# Patient Record
Sex: MALE | Race: BLACK OR AFRICAN AMERICAN | Employment: FULL TIME | URBAN - NONMETROPOLITAN AREA
[De-identification: names, ages, dates, MRNs, and addresses within clinical notes are randomized per-mention and may not be internally consistent; named-entity substitution may affect disease eponyms.]

---

## 2020-07-20 ENCOUNTER — HOSPITAL ENCOUNTER (EMERGENCY)
Age: 40
Discharge: HOME | End: 2020-07-21
Attending: EMERGENCY MEDICINE
Payer: MEDICAID

## 2020-07-20 ENCOUNTER — APPOINTMENT (OUTPATIENT)
Dept: GENERAL RADIOLOGY | Age: 40
End: 2020-07-20
Payer: MEDICAID

## 2020-07-20 PROCEDURE — 99284 EMERGENCY DEPT VISIT MOD MDM: CPT

## 2020-07-20 PROCEDURE — 6360000002 HC RX W HCPCS: Performed by: EMERGENCY MEDICINE

## 2020-07-20 PROCEDURE — 73502 X-RAY EXAM HIP UNI 2-3 VIEWS: CPT

## 2020-07-20 PROCEDURE — 96372 THER/PROPH/DIAG INJ SC/IM: CPT

## 2020-07-20 PROCEDURE — 72100 X-RAY EXAM L-S SPINE 2/3 VWS: CPT

## 2020-07-20 PROCEDURE — 71046 X-RAY EXAM CHEST 2 VIEWS: CPT

## 2020-07-20 PROCEDURE — 73030 X-RAY EXAM OF SHOULDER: CPT

## 2020-07-20 PROCEDURE — 72040 X-RAY EXAM NECK SPINE 2-3 VW: CPT

## 2020-07-20 PROCEDURE — 73564 X-RAY EXAM KNEE 4 OR MORE: CPT

## 2020-07-20 RX ORDER — ORPHENADRINE CITRATE 30 MG/ML
60 INJECTION INTRAMUSCULAR; INTRAVENOUS ONCE
Status: COMPLETED | OUTPATIENT
Start: 2020-07-20 | End: 2020-07-20

## 2020-07-20 RX ORDER — METHYLPREDNISOLONE SODIUM SUCCINATE 125 MG/2ML
40 INJECTION, POWDER, LYOPHILIZED, FOR SOLUTION INTRAMUSCULAR; INTRAVENOUS ONCE
Status: COMPLETED | OUTPATIENT
Start: 2020-07-20 | End: 2020-07-20

## 2020-07-20 RX ORDER — KETOROLAC TROMETHAMINE 30 MG/ML
30 INJECTION, SOLUTION INTRAMUSCULAR; INTRAVENOUS ONCE
Status: COMPLETED | OUTPATIENT
Start: 2020-07-20 | End: 2020-07-20

## 2020-07-20 RX ADMIN — ORPHENADRINE CITRATE 60 MG: 30 INJECTION INTRAMUSCULAR; INTRAVENOUS at 22:10

## 2020-07-20 RX ADMIN — KETOROLAC TROMETHAMINE 30 MG: 30 INJECTION, SOLUTION INTRAMUSCULAR at 22:09

## 2020-07-20 RX ADMIN — METHYLPREDNISOLONE SODIUM SUCCINATE 40 MG: 125 INJECTION, POWDER, FOR SOLUTION INTRAMUSCULAR; INTRAVENOUS at 22:09

## 2020-07-20 ASSESSMENT — ENCOUNTER SYMPTOMS
BLOOD IN STOOL: 0
DIARRHEA: 0
SORE THROAT: 0
CHOKING: 0
ABDOMINAL PAIN: 0
BACK PAIN: 1
RHINORRHEA: 0
PHOTOPHOBIA: 0
SHORTNESS OF BREATH: 0
EYE ITCHING: 0
CHEST TIGHTNESS: 0
SINUS PRESSURE: 0
WHEEZING: 0
COUGH: 0
NAUSEA: 0
VOICE CHANGE: 0
EYE DISCHARGE: 0
TROUBLE SWALLOWING: 0
EYE PAIN: 0
VOMITING: 0
EYE REDNESS: 0
ABDOMINAL DISTENTION: 0
CONSTIPATION: 0

## 2020-07-20 ASSESSMENT — PAIN SCALES - GENERAL
PAINLEVEL_OUTOF10: 4
PAINLEVEL_OUTOF10: 7
PAINLEVEL_OUTOF10: 8

## 2020-07-20 ASSESSMENT — PAIN DESCRIPTION - PAIN TYPE
TYPE: ACUTE PAIN
TYPE: ACUTE PAIN

## 2020-07-20 ASSESSMENT — PAIN DESCRIPTION - LOCATION: LOCATION: SHOULDER;KNEE;BACK

## 2020-07-20 ASSESSMENT — PAIN DESCRIPTION - ORIENTATION: ORIENTATION: LEFT

## 2020-07-20 NOTE — LETTER
[address] [facility]"" [address] [address] EMERGENCY DEPT  [address] [place] [address]  Phone: [phone]             July 21, 2020    Patient: Abdiel Thomas   YOB: 1980   Date of Visit: 7/20/2020       To Whom It May Concern:    Abdiel Thomas was seen and treated in our emergency department on 7/20/2020. He may return to work on 7/22/2020.       Sincerely,   Sarai Morales Call RN          Signature:__________________________________

## 2020-07-21 ENCOUNTER — APPOINTMENT (OUTPATIENT)
Dept: CT IMAGING | Age: 40
End: 2020-07-21
Payer: MEDICAID

## 2020-07-21 VITALS
HEIGHT: 68 IN | HEART RATE: 76 BPM | WEIGHT: 195 LBS | OXYGEN SATURATION: 96 % | RESPIRATION RATE: 16 BRPM | TEMPERATURE: 98.3 F | DIASTOLIC BLOOD PRESSURE: 79 MMHG | SYSTOLIC BLOOD PRESSURE: 123 MMHG | BODY MASS INDEX: 29.55 KG/M2

## 2020-07-21 PROCEDURE — 72192 CT PELVIS W/O DYE: CPT

## 2020-07-21 PROCEDURE — 6370000000 HC RX 637 (ALT 250 FOR IP): Performed by: EMERGENCY MEDICINE

## 2020-07-21 RX ORDER — HYDROCODONE BITARTRATE AND ACETAMINOPHEN 5; 325 MG/1; MG/1
1 TABLET ORAL ONCE
Status: COMPLETED | OUTPATIENT
Start: 2020-07-21 | End: 2020-07-21

## 2020-07-21 RX ORDER — IBUPROFEN 400 MG/1
400 TABLET ORAL EVERY 6 HOURS PRN
Qty: 120 TABLET | Refills: 0 | Status: SHIPPED | OUTPATIENT
Start: 2020-07-21 | End: 2021-04-01

## 2020-07-21 RX ORDER — CYCLOBENZAPRINE HCL 10 MG
10 TABLET ORAL 3 TIMES DAILY PRN
Qty: 21 TABLET | Refills: 0 | Status: SHIPPED | OUTPATIENT
Start: 2020-07-21 | End: 2020-07-31

## 2020-07-21 RX ADMIN — HYDROCODONE BITARTRATE AND ACETAMINOPHEN 1 TABLET: 5; 325 TABLET ORAL at 00:59

## 2020-07-21 ASSESSMENT — PAIN SCALES - GENERAL
PAINLEVEL_OUTOF10: 6
PAINLEVEL_OUTOF10: 5
PAINLEVEL_OUTOF10: 6

## 2020-07-21 ASSESSMENT — PAIN DESCRIPTION - PAIN TYPE: TYPE: ACUTE PAIN
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
## 2020-07-21 NOTE — ED NOTES
Pt medicated per MAR. Pt tolerated well. Pt shows no signs of distress.       Zeny GREY RN  07/21/20 6432

## 2020-07-21 NOTE — ED NOTES
Pt medicated per MAR. Pt tolerated well. Pt updated on POC. Pt respirations unlabored.       Vandana CASTILLO, RN  07/20/20 2521

## 2021-01-06 ENCOUNTER — HOSPITAL ENCOUNTER (EMERGENCY)
Age: 41
Discharge: HOME | End: 2021-01-06
Payer: MEDICAID

## 2021-01-06 ENCOUNTER — APPOINTMENT (OUTPATIENT)
Dept: GENERAL RADIOLOGY | Age: 41
End: 2021-01-06
Payer: MEDICAID

## 2021-01-06 VITALS
RESPIRATION RATE: 18 BRPM | HEIGHT: 68 IN | WEIGHT: 200 LBS | BODY MASS INDEX: 30.31 KG/M2 | TEMPERATURE: 97.5 F | DIASTOLIC BLOOD PRESSURE: 95 MMHG | OXYGEN SATURATION: 97 % | SYSTOLIC BLOOD PRESSURE: 124 MMHG | HEART RATE: 61 BPM

## 2021-01-06 DIAGNOSIS — R07.89 CHEST WALL PAIN: Primary | ICD-10-CM

## 2021-01-06 LAB
ANION GAP SERPL CALCULATED.3IONS-SCNC: 12 MEQ/L (ref 8–16)
BASOPHILS # BLD: 0.5 %
BASOPHILS ABSOLUTE: 0 THOU/MM3 (ref 0–0.1)
BUN BLDV-MCNC: 10 MG/DL (ref 7–22)
CALCIUM SERPL-MCNC: 8.9 MG/DL (ref 8.5–10.5)
CHLORIDE BLD-SCNC: 105 MEQ/L (ref 98–111)
CO2: 22 MEQ/L (ref 23–33)
CREAT SERPL-MCNC: 0.7 MG/DL (ref 0.4–1.2)
EKG ATRIAL RATE: 63 BPM
EKG P AXIS: 40 DEGREES
EKG P-R INTERVAL: 188 MS
EKG Q-T INTERVAL: 386 MS
EKG QRS DURATION: 84 MS
EKG QTC CALCULATION (BAZETT): 395 MS
EKG R AXIS: 59 DEGREES
EKG T AXIS: 45 DEGREES
EKG VENTRICULAR RATE: 63 BPM
EOSINOPHIL # BLD: 1.9 %
EOSINOPHILS ABSOLUTE: 0.1 THOU/MM3 (ref 0–0.4)
ERYTHROCYTE [DISTWIDTH] IN BLOOD BY AUTOMATED COUNT: 11.3 % (ref 11.5–14.5)
ERYTHROCYTE [DISTWIDTH] IN BLOOD BY AUTOMATED COUNT: 39.6 FL (ref 35–45)
GFR SERPL CREATININE-BSD FRML MDRD: > 90 ML/MIN/1.73M2
GLUCOSE BLD-MCNC: 82 MG/DL (ref 70–108)
HCT VFR BLD CALC: 42.5 % (ref 42–52)
HEMOGLOBIN: 14.5 GM/DL (ref 14–18)
IMMATURE GRANS (ABS): 0.04 THOU/MM3 (ref 0–0.07)
IMMATURE GRANULOCYTES: 0.6 %
LYMPHOCYTES # BLD: 40.4 %
LYMPHOCYTES ABSOLUTE: 2.6 THOU/MM3 (ref 1–4.8)
MCH RBC QN AUTO: 32.7 PG (ref 26–33)
MCHC RBC AUTO-ENTMCNC: 34.1 GM/DL (ref 32.2–35.5)
MCV RBC AUTO: 95.7 FL (ref 80–94)
MONOCYTES # BLD: 10.6 %
MONOCYTES ABSOLUTE: 0.7 THOU/MM3 (ref 0.4–1.3)
NUCLEATED RED BLOOD CELLS: 0 /100 WBC
OSMOLALITY CALCULATION: 275.7 MOSMOL/KG (ref 275–300)
PLATELET # BLD: 220 THOU/MM3 (ref 130–400)
PMV BLD AUTO: 9.6 FL (ref 9.4–12.4)
POTASSIUM SERPL-SCNC: 4.3 MEQ/L (ref 3.5–5.2)
RBC # BLD: 4.44 MILL/MM3 (ref 4.7–6.1)
SEG NEUTROPHILS: 46 %
SEGMENTED NEUTROPHILS ABSOLUTE COUNT: 2.9 THOU/MM3 (ref 1.8–7.7)
SODIUM BLD-SCNC: 139 MEQ/L (ref 135–145)
TROPONIN T: < 0.01 NG/ML
TROPONIN T: < 0.01 NG/ML
WBC # BLD: 6.4 THOU/MM3 (ref 4.8–10.8)

## 2021-01-06 PROCEDURE — 36415 COLL VENOUS BLD VENIPUNCTURE: CPT

## 2021-01-06 PROCEDURE — 71046 X-RAY EXAM CHEST 2 VIEWS: CPT

## 2021-01-06 PROCEDURE — 85025 COMPLETE CBC W/AUTO DIFF WBC: CPT

## 2021-01-06 PROCEDURE — 84484 ASSAY OF TROPONIN QUANT: CPT

## 2021-01-06 PROCEDURE — 99284 EMERGENCY DEPT VISIT MOD MDM: CPT

## 2021-01-06 PROCEDURE — 6370000000 HC RX 637 (ALT 250 FOR IP): Performed by: PHYSICIAN ASSISTANT

## 2021-01-06 PROCEDURE — 96374 THER/PROPH/DIAG INJ IV PUSH: CPT

## 2021-01-06 PROCEDURE — 80048 BASIC METABOLIC PNL TOTAL CA: CPT

## 2021-01-06 PROCEDURE — 6360000002 HC RX W HCPCS: Performed by: PHYSICIAN ASSISTANT

## 2021-01-06 PROCEDURE — 93005 ELECTROCARDIOGRAM TRACING: CPT | Performed by: PHYSICIAN ASSISTANT

## 2021-01-06 PROCEDURE — 96372 THER/PROPH/DIAG INJ SC/IM: CPT

## 2021-01-06 RX ORDER — NAPROXEN 500 MG/1
500 TABLET ORAL 2 TIMES DAILY
Qty: 60 TABLET | Refills: 0 | Status: SHIPPED | OUTPATIENT
Start: 2021-01-06 | End: 2021-01-27

## 2021-01-06 RX ORDER — LIDOCAINE 50 MG/G
1 PATCH TOPICAL DAILY
Qty: 15 PATCH | Refills: 0 | Status: SHIPPED | OUTPATIENT
Start: 2021-01-06 | End: 2021-01-27

## 2021-01-06 RX ORDER — LIDOCAINE 4 G/G
1 PATCH TOPICAL ONCE
Status: DISCONTINUED | OUTPATIENT
Start: 2021-01-06 | End: 2021-01-06 | Stop reason: HOSPADM

## 2021-01-06 RX ORDER — CYCLOBENZAPRINE HCL 10 MG
5 TABLET ORAL 3 TIMES DAILY PRN
Qty: 15 TABLET | Refills: 0 | Status: SHIPPED | OUTPATIENT
Start: 2021-01-06 | End: 2021-01-16

## 2021-01-06 RX ORDER — KETOROLAC TROMETHAMINE 30 MG/ML
30 INJECTION, SOLUTION INTRAMUSCULAR; INTRAVENOUS ONCE
Status: COMPLETED | OUTPATIENT
Start: 2021-01-06 | End: 2021-01-06

## 2021-01-06 RX ORDER — ORPHENADRINE CITRATE 30 MG/ML
60 INJECTION INTRAMUSCULAR; INTRAVENOUS ONCE
Status: COMPLETED | OUTPATIENT
Start: 2021-01-06 | End: 2021-01-06

## 2021-01-06 RX ADMIN — ORPHENADRINE CITRATE 60 MG: 30 INJECTION INTRAMUSCULAR; INTRAVENOUS at 10:52

## 2021-01-06 RX ADMIN — KETOROLAC TROMETHAMINE 30 MG: 30 INJECTION, SOLUTION INTRAMUSCULAR at 10:52

## 2021-01-06 ASSESSMENT — ENCOUNTER SYMPTOMS
DIARRHEA: 0
ABDOMINAL PAIN: 0
SHORTNESS OF BREATH: 0
COUGH: 0
SORE THROAT: 0
COLOR CHANGE: 0
VOMITING: 0

## 2021-01-06 ASSESSMENT — PAIN SCALES - GENERAL: PAINLEVEL_OUTOF10: 7

## 2021-01-06 ASSESSMENT — PAIN DESCRIPTION - DESCRIPTORS: DESCRIPTORS: PRESSURE

## 2021-01-06 ASSESSMENT — PAIN DESCRIPTION - LOCATION: LOCATION: ARM;CHEST

## 2021-01-06 ASSESSMENT — PAIN DESCRIPTION - ORIENTATION: ORIENTATION: RIGHT

## 2021-01-06 NOTE — ED NOTES
Pt resting in bed with side rails up 2x2 and call light in reach. Vital signs assessed and stable. IV site checked. Pt updated on plan of care. Pt voiced understanding. Pt verbalized no other needs or concerns at this time.         Jeaentte Caruso RN  01/06/21 9508

## 2021-01-06 NOTE — ED TRIAGE NOTES
PT in bed. PT stated that two days ago he started having chest pressure. PT stated that he was laying down at the time. PT stated that the pressure is increasing. PT stated that he feels in on his right chest and down his right arm. PT in no distress.

## 2021-01-07 NOTE — ED PROVIDER NOTES
[facility]  eMERGENCY dEPARTMENT eNCOUnter          CHIEF COMPLAINT       Chief Complaint   Patient presents with    Chest Pain       Nurses Notes reviewed and I agree except as noted inthe HPI. HISTORY OF PRESENT ILLNESS    Billy Forrester is a 41 y.o. male who presents to the Emergency Department for the evaluation of chest pain. Patient states that 2 days ago while laying down he began having pain in the right chest.  He describes this as a heaviness and a pressure, feeling almost like he has a dislocated shoulder. Reports worsening with movement of the shoulder or elevation of the arm and states pain is been constant, mildly improved with Tylenol. He states that it initially radiated into the back and then into the chest.  He reports some intermittent numbness in his right hand and occasionally numbness throughout the whole right upper extremity with movement of the arm. He denies any known injury or activity changes. No radicular pain. No shortness of breath, dizziness, nausea, syncope, edema, fevers, chills or recent travel. No history of hypertension, hyperlipidemia, diabetes or smoking and family history of heart disease did not start until his mother was in her 76s. The HPI was provided by the patient. REVIEW OF SYSTEMS     Review of Systems   Constitutional: Negative for chills and fever. HENT: Negative for sore throat. Respiratory: Negative for cough and shortness of breath. Cardiovascular: Positive for chest pain. Negative for palpitations and leg swelling. Gastrointestinal: Negative for abdominal pain, diarrhea and vomiting. Musculoskeletal: Positive for arthralgias and neck pain (mild). Skin: Negative for color change. Neurological: Positive for numbness. Negative for syncope and headaches. PAST MEDICAL HISTORY    has no past medical history on file. SURGICAL HISTORY      has no past surgical history on file.     CURRENT MEDICATIONS Discharge Medication List as of 2021  1:08 PM      CONTINUE these medications which have NOT CHANGED    Details   ibuprofen (IBU) 400 MG tablet Take 1 tablet by mouth every 6 hours as needed for Pain, Disp-120 tablet,R-0Print             ALLERGIES     has No Known Allergies. FAMILY HISTORY     He indicated that his mother is alive. He indicated that his father is . family history includes Heart Disease in his mother. SOCIAL HISTORY      reports that he has quit smoking. He has never used smokeless tobacco. He reports current alcohol use. He reports that he does not use drugs. PHYSICAL EXAM     INITIAL VITALS:  height is 5' 8\" (1.727 m) and weight is 200 lb (90.7 kg). His oral temperature is 97.5 °F (36.4 °C). His blood pressure is 124/95 (abnormal) and his pulse is 61. His respiration is 18 and oxygen saturation is 97%. Physical Exam  Vitals signs and nursing note reviewed. Constitutional:       Appearance: Normal appearance. HENT:      Head: Normocephalic and atraumatic. Eyes:      Conjunctiva/sclera: Conjunctivae normal.   Neck:      Musculoskeletal: Normal range of motion. No muscular tenderness. Cardiovascular:      Rate and Rhythm: Normal rate and regular rhythm. Pulses: Normal pulses. Radial pulses are 2+ on the right side. Heart sounds: Normal heart sounds. No murmur. Pulmonary:      Effort: Pulmonary effort is normal. No respiratory distress. Breath sounds: Normal breath sounds. No wheezing. Musculoskeletal:      Right shoulder: Normal.      Cervical back: Normal.      Thoracic back: Normal.        Arms:    Skin:     General: Skin is warm and dry. Neurological:      General: No focal deficit present. Mental Status: He is alert and oriented to person, place, and time. GCS: GCS eye subscore is 4. GCS verbal subscore is 5. GCS motor subscore is 6. Cranial Nerves: Cranial nerves are intact. Sensory: Sensation is intact. Motor: Motor function is intact. Psychiatric:         Mood and Affect: Mood normal.         DIFFERENTIAL DIAGNOSIS:   Differential diagnoses are discussed    DIAGNOSTIC RESULTS     EKG: All EKG's are interpreted by the Emergency Department Physician who either signs or Co-signsthis chart in the absence of a cardiologist.    Vent. Rate: 63 bpm  PRinterval: 188 ms  QRS duration: 84 ms  QTc: 395 ms  P-R-T axes: 40, 59, 45  Normal sinus rhythm. No STEMI. RADIOLOGY: non-plain film images(s) such as CT, Ultrasound and MRI are read by the radiologist.    XR CHEST (2 VW)   Final Result   Stable radiographic appearance of the chest. No evidence of an acute process. **This report has been created using voice recognition software. It may contain minor errors which are inherent in voice recognition technology. **      Final report electronically signed by Dr. Kate Sanchez on 1/6/2021 9:49 AM          LABS:      Labs Reviewed   CBC WITH AUTO DIFFERENTIAL - Abnormal; Notable for the following components:       Result Value    RBC 4.44 (*)     MCV 95.7 (*)     RDW-CV 11.3 (*)     All other components within normal limits   BASIC METABOLIC PANEL - Abnormal; Notable for the following components:    CO2 22 (*)     All other components within normal limits   TROPONIN   ANION GAP   OSMOLALITY   GLOMERULAR FILTRATION RATE, ESTIMATED   TROPONIN       EMERGENCY DEPARTMENT COURSE:   Vitals:    Vitals:    01/06/21 0911 01/06/21 1050   BP: (!) 124/95    Pulse: 59 61   Resp: 18    Temp: 97.5 °F (36.4 °C)    TempSrc: Oral    SpO2: 98% 97%   Weight: 200 lb (90.7 kg)    Height: 5' 8\" (1.727 m)       10:13 PM EST: The patient was seen and evaluated. Patient presents for complaints of right-sided chest pain has been ongoing for the past 2 days. It is constant, worsens with movement of the right arm with occasional right upper extremity paresthesias. He had intact sensation, movement, vascular flow on examination.   No

## 2021-01-17 ENCOUNTER — SURGICAL CONSULT (OUTPATIENT)
Dept: UROLOGY | Age: 41
End: 2021-01-17

## 2021-01-17 ENCOUNTER — HOSPITAL ENCOUNTER (EMERGENCY)
Age: 41
Discharge: HOME | End: 2021-01-17
Attending: EMERGENCY MEDICINE
Payer: MEDICAID

## 2021-01-17 VITALS
OXYGEN SATURATION: 96 % | RESPIRATION RATE: 15 BRPM | BODY MASS INDEX: 30.41 KG/M2 | SYSTOLIC BLOOD PRESSURE: 131 MMHG | HEART RATE: 76 BPM | WEIGHT: 200 LBS | DIASTOLIC BLOOD PRESSURE: 77 MMHG | TEMPERATURE: 97.6 F

## 2021-01-17 DIAGNOSIS — N48.30 PRIAPISM: Primary | ICD-10-CM

## 2021-01-17 PROCEDURE — 2580000003 HC RX 258: Performed by: EMERGENCY MEDICINE

## 2021-01-17 PROCEDURE — 6370000000 HC RX 637 (ALT 250 FOR IP)

## 2021-01-17 PROCEDURE — 99283 EMERGENCY DEPT VISIT LOW MDM: CPT

## 2021-01-17 PROCEDURE — 6360000002 HC RX W HCPCS: Performed by: EMERGENCY MEDICINE

## 2021-01-17 PROCEDURE — 96372 THER/PROPH/DIAG INJ SC/IM: CPT

## 2021-01-17 RX ORDER — MORPHINE SULFATE 4 MG/ML
4 INJECTION, SOLUTION INTRAMUSCULAR; INTRAVENOUS ONCE
Status: COMPLETED | OUTPATIENT
Start: 2021-01-17 | End: 2021-01-17

## 2021-01-17 RX ORDER — LIDOCAINE HYDROCHLORIDE 20 MG/ML
INJECTION, SOLUTION EPIDURAL; INFILTRATION; INTRACAUDAL; PERINEURAL
Status: DISCONTINUED
Start: 2021-01-17 | End: 2021-01-17 | Stop reason: HOSPADM

## 2021-01-17 RX ORDER — ONDANSETRON 4 MG/1
TABLET, ORALLY DISINTEGRATING ORAL
Status: COMPLETED
Start: 2021-01-17 | End: 2021-01-17

## 2021-01-17 RX ORDER — ONDANSETRON 4 MG/1
4 TABLET, ORALLY DISINTEGRATING ORAL ONCE
Status: COMPLETED | OUTPATIENT
Start: 2021-01-17 | End: 2021-01-17

## 2021-01-17 RX ADMIN — HYDROMORPHONE HYDROCHLORIDE 1 MG: 1 INJECTION, SOLUTION INTRAMUSCULAR; INTRAVENOUS; SUBCUTANEOUS at 08:42

## 2021-01-17 RX ADMIN — MORPHINE SULFATE 4 MG: 4 INJECTION, SOLUTION INTRAMUSCULAR; INTRAVENOUS at 11:04

## 2021-01-17 RX ADMIN — ONDANSETRON 4 MG: 4 TABLET, ORALLY DISINTEGRATING ORAL at 10:09

## 2021-01-17 RX ADMIN — PHENYLEPHRINE HYDROCHLORIDE: 10 INJECTION INTRAVENOUS at 09:06

## 2021-01-17 NOTE — ED NOTES
Presents with complaints of prolonged erection. States that he took an new medication for erectile dysfunction.      Harinder Deras RN  01/17/21 2023

## 2021-01-17 NOTE — ED PROVIDER NOTES
[address] [facility] [address] [address] EMERGENCY DEPT    EMERGENCY MEDICINE     Pt Name: Anju Feliciano  MRN: [id number]  [place] 1980  Date of evaluation: 1/17/2021  Provider: Clarissa Charlton DO, [facility]       Chief Complaint   Patient presents with    Personal Problem       HISTORY OF PRESENT ILLNESS    Anju Feliciano is a pleasant 41 y.o. male who presents to the emergency department from home for evaluation of priapism. The patient states he injected Trimix which is prescribed to him for erectile dysfunction. He injected this at 4:00 this morning, had immediate erection which has now lasted approximately 4-1/2 hours. He did try to take some Sudafed to try to get it down, and took a total of 4 pills, 2 at a time, last dose being approximately an hour ago, states that there election erection is improved somewhat, but continues. No previous history of priapism in the past, denies any cocaine use, denies any other medication use. Triage notes and Nursing notes were reviewed by myself. Any discrepancies are addressed above. PAST MEDICAL HISTORY   History reviewed. No pertinent past medical history. SURGICAL HISTORY     History reviewed. No pertinent surgical history. CURRENT MEDICATIONS       Previous Medications    IBUPROFEN (IBU) 400 MG TABLET    Take 1 tablet by mouth every 6 hours as needed for Pain    LIDOCAINE (LIDODERM) 5 %    Place 1 patch onto the skin daily 12 hours on, 12 hours off. NAPROXEN (NAPROSYN) 500 MG TABLET    Take 1 tablet by mouth 2 times daily Do not take with ibuprofen, advil, motrin, or aleve. ALLERGIES     Patient has no known allergies.     FAMILY HISTORY       Family History   Problem Relation Age of Onset    Heart Disease Mother         SOCIAL HISTORY       Social History     Socioeconomic History    Marital status: Single     Spouse name: None    Number of children: None    Years of education: None    Highest education level: None   Occupational History    None   Social Needs    Financial resource strain: None    Food insecurity     Worry: None     Inability: None    Transportation needs     Medical: None     Non-medical: None   Tobacco Use    Smoking status: Former Smoker    Smokeless tobacco: Never Used   Substance and Sexual Activity    Alcohol use: Yes     Comment: social     Drug use: Never    Sexual activity: None   Lifestyle    Physical activity     Days per week: None     Minutes per session: None    Stress: None   Relationships    Social connections     Talks on phone: None     Gets together: None     Attends [employer] service: None     Active member of club or organization: None     Attends meetings of clubs or organizations: None     Relationship status: None    Intimate partner violence     Fear of current or ex partner: None     Emotionally abused: None     Physically abused: None     Forced sexual activity: None   Other Topics Concern    None   Social History Narrative    None       REVIEW OF SYSTEMS     Review of Systems   Constitutional: Negative for chills and fever. Genitourinary: Positive for penile swelling. Except as noted above the remainder of the review of systems was reviewed and is. PHYSICAL EXAM    (up to 7 for level 4, 8 or more for level 5)     ED Triage Vitals [01/17/21 0810]   BP Temp Temp Source Pulse Resp SpO2 Height Weight   136/78 97.6 °F (36.4 °C) Oral 70 15 98 % -- 200 lb (90.7 kg)       Physical Exam  Vitals signs and nursing note reviewed. Constitutional:       Appearance: He is well-developed. HENT:      Head: Normocephalic. Eyes:      Conjunctiva/sclera: Conjunctivae normal.      Pupils: Pupils are equal, round, and reactive to light. Neck:      Musculoskeletal: Neck supple. Trachea: No tracheal deviation. Pulmonary:      Effort: Pulmonary effort is normal.   Genitourinary:     Comments: Priapism present  Musculoskeletal: Normal range of motion. Skin:     General: Skin is warm and dry. Neurological:      Mental Status: He is alert and oriented to person, place, and time. Cranial Nerves: No cranial nerve deficit. DIAGNOSTIC RESULTS     EKG:(none if blank)  All EKG's are interpreted [facility] who either signs or Co-signs this chart in the absence of a cardiologist.    RADIOLOGY: (none if blank)   Interpretation per the Radiologistbelow, if available at the time of this note:    No orders to display       LABS:  Labs Reviewed - No data to display    All other labs were within normal range or not returned as of this dictation. Please note, any cultures that may have been sent were not resulted at the time of this patient visit. EMERGENCY DEPARTMENT COURSE andMedical Decision Making:     MDM/  ED Course as of Jan 17 1221   William Floyd Jan 17, 2021   6379 Discussed case with urology PA, Fco Dodson who discussed with Dr. Elpidio Pruitt, urology, they requested that we perform injection of intracavernosal Aldair-Synephrine. Patient consented for the procedure, risks and benefits discussed, he is agreeable. Betadine utilized to prep the area, 0.4 mg of intracavernosal Aldair-Synephrine injected after aspirating dark blood from the 10 o'clock position    [AB]   0937 Reinjected 0.5 mg intracavernosal Aldair-Synephrine, patient tolerated this well    [AB]   1019 We injected 0.5 mg intracavernosal Aldair-Synephrine, patient tolerated well, utilized left intracavernosal approach this time. Patient does feel mildly nauseated, will give some Zofran. Vitals normal    [AB]   1105 Urology at the bedside to evaluate and drain    [AB]   1148 Dr. Marcie Omalley performed a penile block, penis has detumesced. Patient feeling well and tolerating well    [AB]      ED Course User Index  [AB] Alachani Page, DO     Patient monitored in the department an hour after the detumescence due to drainage by urology. He has been doing well and remained stable without symptoms.   Dr. Marcie Omalley mentioned that there is a right-sided shaft hematoma secondary to the injection, he did apply a pressure dressing and states he is comfortable with this and the patient can follow-up in the office        Strict returnprecautions and follow up instructions were discussed with the patient with which the patient agrees      The patient was evaluated during the COVID-19 pandemic. At the time of presentation to the ED, a [facility] emergency is in effect due to widespread infections and high community spread. There is a significant strain on healthcare resources due to the pandemic. The patient was screened today during their presentation for commonly recognized symptoms and signs of COVID-19 infection. Their evaluation, treatment and testing was consistent with current guidelines for patients who present with complaints or symptoms that may be related to COVID-19. ED Medications administered this visit:    Medications   lidocaine PF 2 % injection (has no administration in time range)   lidocaine PF 2 % injection (has no administration in time range)   phenylephrine (ED-SYNEPHRINE) 5 mg in sodium chloride (PF) 10 mL syringe (has no administration in time range)   lidocaine PF 2 % injection (has no administration in time range)   phenylephrine (ED-SYNEPHRINE) 5 mg in sodium chloride (PF) 10 mL syringe ( Intracavernosal Given 1/17/21 0906)   HYDROmorphone (DILAUDID) injection 1 mg (1 mg Intramuscular Given 1/17/21 0842)   ondansetron (ZOFRAN-ODT) disintegrating tablet 4 mg (4 mg Oral Given 1/17/21 1009)   morphine injection 4 mg (4 mg Intramuscular Given 1/17/21 1104)         Procedures: (None if blank)       CLINICAL       1.  Priapism          DISPOSITION/PLAN   DISPOSITION Decision To Discharge 01/17/2021 12:19:25 PM      PATIENT REFERRED TO:  Bindu Serrato MD  [address] [phone]    In 2 days        DISCHARGE MEDICATIONS:  New Prescriptions    No medications on file              (Please note that portions of this note were completed with a voice recognition program.  Efforts were made to edit the dictations but occasionallywords are mis-transcribed.)      Johnie Obrien DO, FACEP (electronically signed)  Attending Physician, Emergency [address], DO  01/17/21 1222

## 2021-01-17 NOTE — PROCEDURES
Genitalia were prepped and draped in sterile manner. 2% lidocaine penile block administered circumferentially. 18 gauge needle with 10 ml syringe placed in the right corpus, mid shaft of penis. Deoxygenated blood aspirated- approximately 50-60 ml.   0.9N saline used to irrigate copora. Phenylephrine, 10mg/ml concentration used. 0.5-1.0 ml q 5 minutes x 4 used to achieve complete detumescence while VS were monitored. Kerlex wrap placed around the flaccid penis. Patient tolerated the procedure well. Will follow up with me in 2 weeks. Instructed to not inject any medication into the penis until after I see him in clinic. Return to ED if priapism recurs.

## 2021-01-21 ENCOUNTER — TELEPHONE (OUTPATIENT)
Dept: UROLOGY | Age: 41
End: 2021-01-21

## 2021-01-21 ENCOUNTER — HOSPITAL ENCOUNTER (EMERGENCY)
Age: 41
Discharge: HOME | End: 2021-01-21
Attending: EMERGENCY MEDICINE
Payer: MEDICAID

## 2021-01-21 VITALS
RESPIRATION RATE: 19 BRPM | OXYGEN SATURATION: 97 % | SYSTOLIC BLOOD PRESSURE: 123 MMHG | TEMPERATURE: 98.1 F | HEART RATE: 74 BPM | DIASTOLIC BLOOD PRESSURE: 77 MMHG

## 2021-01-21 DIAGNOSIS — N48.33 PRIAPISM, DRUG-INDUCED: Primary | ICD-10-CM

## 2021-01-21 PROCEDURE — 2500000003 HC RX 250 WO HCPCS: Performed by: EMERGENCY MEDICINE

## 2021-01-21 PROCEDURE — 94640 AIRWAY INHALATION TREATMENT: CPT

## 2021-01-21 PROCEDURE — 2580000003 HC RX 258: Performed by: EMERGENCY MEDICINE

## 2021-01-21 PROCEDURE — 6360000002 HC RX W HCPCS: Performed by: EMERGENCY MEDICINE

## 2021-01-21 PROCEDURE — 94760 N-INVAS EAR/PLS OXIMETRY 1: CPT

## 2021-01-21 PROCEDURE — 54220 IRRG CRPRA CAVRNOSA PRIAPISM: CPT | Performed by: UROLOGY

## 2021-01-21 PROCEDURE — 54235 NJX CORPORA CAVERNOSA RX AGT: CPT | Performed by: UROLOGY

## 2021-01-21 PROCEDURE — 99282 EMERGENCY DEPT VISIT SF MDM: CPT | Performed by: UROLOGY

## 2021-01-21 PROCEDURE — 96374 THER/PROPH/DIAG INJ IV PUSH: CPT

## 2021-01-21 PROCEDURE — 99285 EMERGENCY DEPT VISIT HI MDM: CPT

## 2021-01-21 PROCEDURE — 96375 TX/PRO/DX INJ NEW DRUG ADDON: CPT

## 2021-01-21 RX ORDER — ONDANSETRON 2 MG/ML
4 INJECTION INTRAMUSCULAR; INTRAVENOUS ONCE
Status: COMPLETED | OUTPATIENT
Start: 2021-01-21 | End: 2021-01-21

## 2021-01-21 RX ORDER — ALBUTEROL SULFATE 2.5 MG/3ML
2.5 SOLUTION RESPIRATORY (INHALATION) ONCE
Status: COMPLETED | OUTPATIENT
Start: 2021-01-21 | End: 2021-01-21

## 2021-01-21 RX ORDER — MORPHINE SULFATE 4 MG/ML
4 INJECTION, SOLUTION INTRAMUSCULAR; INTRAVENOUS ONCE
Status: COMPLETED | OUTPATIENT
Start: 2021-01-21 | End: 2021-01-21

## 2021-01-21 RX ORDER — LIDOCAINE HYDROCHLORIDE 10 MG/ML
5 INJECTION, SOLUTION EPIDURAL; INFILTRATION; INTRACAUDAL; PERINEURAL ONCE
Status: COMPLETED | OUTPATIENT
Start: 2021-01-21 | End: 2021-01-21

## 2021-01-21 RX ADMIN — LIDOCAINE HYDROCHLORIDE 5 ML: 10 INJECTION, SOLUTION EPIDURAL; INFILTRATION; INTRACAUDAL at 06:28

## 2021-01-21 RX ADMIN — ALBUTEROL SULFATE 2.5 MG: 2.5 SOLUTION RESPIRATORY (INHALATION) at 04:22

## 2021-01-21 RX ADMIN — MORPHINE SULFATE 4 MG: 4 INJECTION, SOLUTION INTRAMUSCULAR; INTRAVENOUS at 05:45

## 2021-01-21 RX ADMIN — PHENYLEPHRINE HYDROCHLORIDE: 10 INJECTION INTRAVENOUS at 06:29

## 2021-01-21 RX ADMIN — ONDANSETRON 4 MG: 2 INJECTION INTRAMUSCULAR; INTRAVENOUS at 05:45

## 2021-01-21 ASSESSMENT — PAIN DESCRIPTION - LOCATION
LOCATION: PENIS
LOCATION: PENIS

## 2021-01-21 ASSESSMENT — ENCOUNTER SYMPTOMS
VOICE CHANGE: 0
SORE THROAT: 0
WHEEZING: 0
NAUSEA: 0
SHORTNESS OF BREATH: 0
BACK PAIN: 0
EYE PAIN: 0
EYE REDNESS: 0
EYE DISCHARGE: 0
COUGH: 0
TROUBLE SWALLOWING: 0
DIARRHEA: 0
VOMITING: 0
SINUS PRESSURE: 0
BLOOD IN STOOL: 0
RHINORRHEA: 0
CONSTIPATION: 0
PHOTOPHOBIA: 0
ABDOMINAL PAIN: 0
CHEST TIGHTNESS: 0
EYE ITCHING: 0
CHOKING: 0
ABDOMINAL DISTENTION: 0

## 2021-01-21 ASSESSMENT — PAIN SCALES - GENERAL
PAINLEVEL_OUTOF10: 10
PAINLEVEL_OUTOF10: 7

## 2021-01-21 ASSESSMENT — PAIN DESCRIPTION - PAIN TYPE: TYPE: ACUTE PAIN

## 2021-01-21 ASSESSMENT — PAIN DESCRIPTION - DESCRIPTORS: DESCRIPTORS: ACHING

## 2021-01-21 NOTE — TELEPHONE ENCOUNTER
Course, I need to put in charges for this patient. He had priapism. I have a procedure note in the chart. I need to put in charges for irrigation of the corpora and injection of vasoconstrictors, or injection of drugs into the corpora to achieve detumescence of the penis. I appreciate your help.   Thank you

## 2021-01-21 NOTE — ED NOTES
ED nurse-to-nurse bedside report    Chief Complaint   Patient presents with    Erectile Dysfunction      LOC: alert and orientated to name, place, date  Vital signs   Vitals:    01/21/21 0526 01/21/21 0601 01/21/21 0630 01/21/21 0658   BP: (!) 131/94 137/88 (!) 150/95 129/85   Pulse: 87 104 86 76   Resp: 20 20 20 20   Temp:       TempSrc:       SpO2: 97% 98% 98% 98%      Pain:    Pain Interventions: N/A  Pain Goal: N/A  Oxygen: No    Current needs required observing for 30 min   Telemetry: Yes  LDAs:   Peripheral IV 01/21/21 Left Antecubital (Active)   Site Assessment Clean; Intact;Dry 01/21/21 0659   Line Status Normal saline locked 01/21/21 0659   Dressing Status Clean;Dry; Intact 01/21/21 0659     Continuous Infusions:   Mobility: Independent  Elizabeth Fall Risk Score: No flowsheet data found. Fall Interventions: Siderails up, call light within reach   Report given to:  [id number] [address], RN  01/21/21 4827

## 2021-01-21 NOTE — ED NOTES
Pt states it feels like \"its getting tighter down there\", and its \"starting to hurt more\".  Will update Dr. Marissa Cheney RN  01/21/21 8902

## 2021-01-21 NOTE — ED NOTES
Pt assessed at this time. No significant changes from prior. Pt asking to be discharged. Pts significant other at bedside.       Mercy Ring RN  01/21/21 2411

## 2021-01-21 NOTE — ED TRIAGE NOTES
Pt presents to the ED from home c/o erectile dysfunction. Pt states that he is taking Trimex and is still trying to figure out the appropriate dosage. Pt states he has had an erection since midnight. Pt respirations easy and unlabored. Pt is alert and oriented 4.

## 2021-01-21 NOTE — ED NOTES
Medications at bedside. Pt respirations easy and unlabored. Pt denies any further needs at this time.       Elise Hernandez RN  01/21/21 2839

## 2021-01-21 NOTE — PROGRESS NOTES
42-year-old black male returns to the emergency department 4 days after injecting vasoactive drugs into the corpora cavernosa is altered and priapism. Early this morning the patient states he injected 10 units of the vasoactive medications commonly known as Trimix, which did not result in an erection. Shortly thereafter he injected 15 more units resulting in an erection but unfortunately became a prolonged painful erection consistent with iatrogenic priapism. The emergency department physician tried conservative measures such as ice packs and albuterol without success. Exam: Alert and oriented male in mild distress. Penis is erect; the glans penis is not engorged. The findings are consistent with low flow priapism. Procedure note: After informed consent was given, Betadine was used to prep the genitalia. Sterile drapes were applied. 1% plain lidocaine was used to do a dorsal nerve block as well as circumferential anesthesia by injecting laterally and ventrally. A total of 10 mL were used. After adequate local anesthesia was achieved, an 18-gauge needle was placed in the left corpus near the base of the penis. Dark deoxygenated blood was aspirated. This was done several times followed by injection of normal saline followed by aspiration of more oxygenated blood. I then started using phenylephrine solution 10 mg/mL. I used 1 mL every 5 minutes for 3 mL, followed by aspiration of blood from the corpora. This process was repeated until all 10 mL of phenylephrine were used. Patient's vital signs were monitored throughout this procedure. Initially his blood pressure was 157/95. It then decreased to 128/70. Patient was never tachycardic. At the end of the procedure the penis was flaccid. The 18-gauge needle was removed pressure was applied at the puncture site. Thereafter Kerlix was used to wrap around the penis. He was instructed to keep the penis elevated and keep it wrapped.   We will observe him

## 2021-01-21 NOTE — ED NOTES
Pt medicated per order. Pt respirations easy and unlabored. Pt needs to be observed for 30 min to ensure erection does not come back. After this observance, pt can leave.       Linda Ta RN  01/21/21 8663

## 2021-01-21 NOTE — ED PROVIDER NOTES
[facility]  eMERGENCY dEPARTMENT eNCOUnter          CHIEF COMPLAINT       Chief Complaint   Patient presents with    Erectile Dysfunction       Nurses Notes reviewed and I agree except as noted in the HPI. HISTORY OF PRESENT ILLNESS    Billy Forrester is a 41 y.o. male who presents preop is some secondary to Trimix injection. Apparently he has had this priapism for approximately 3 hours. He had a similar case where he had have phenylephrine injected with successful detumescence. He is here today because he upped his dosage and that he has had a prolonged erection. Patient is not complaining of any other pain at this time. Currently the patient is resting comfortably on the cot no apparent distress. REVIEW OF SYSTEMS     Review of Systems   Constitutional: Negative for activity change, appetite change, diaphoresis, fatigue and unexpected weight change. HENT: Negative for congestion, ear discharge, ear pain, hearing loss, rhinorrhea, sinus pressure, sore throat, trouble swallowing and voice change. Eyes: Negative for photophobia, pain, discharge, redness and itching. Respiratory: Negative for cough, choking, chest tightness, shortness of breath and wheezing. Cardiovascular: Negative for chest pain, palpitations and leg swelling. Gastrointestinal: Negative for abdominal distention, abdominal pain, blood in stool, constipation, diarrhea, nausea and vomiting. Endocrine: Negative for polydipsia, polyphagia and polyuria. Genitourinary: Positive for penile swelling. Negative for decreased urine volume, difficulty urinating, dysuria, enuresis, frequency, hematuria and urgency. Musculoskeletal: Negative for arthralgias, back pain, gait problem, myalgias, neck pain and neck stiffness. Skin: Negative for pallor and rash. Allergic/Immunologic: Negative for immunocompromised state.    Neurological: Negative for dizziness, tremors, seizures, syncope, facial asymmetry, weakness, light-headedness, numbness and headaches. Hematological: Negative for adenopathy. Does not bruise/bleed easily. Psychiatric/Behavioral: Negative for agitation, hallucinations and suicidal ideas. The patient is not nervous/anxious. PAST MEDICAL HISTORY    has no past medical history on file. SURGICAL HISTORY      has no past surgical history on file. CURRENT MEDICATIONS       Previous Medications    IBUPROFEN (IBU) 400 MG TABLET    Take 1 tablet by mouth every 6 hours as needed for Pain    LIDOCAINE (LIDODERM) 5 %    Place 1 patch onto the skin daily 12 hours on, 12 hours off. NAPROXEN (NAPROSYN) 500 MG TABLET    Take 1 tablet by mouth 2 times daily Do not take with ibuprofen, advil, motrin, or aleve. ALLERGIES     has No Known Allergies. FAMILY HISTORY     He indicated that his mother is alive. He indicated that his father is . family history includes Heart Disease in his mother. SOCIAL HISTORY      reports that he has quit smoking. He has never used smokeless tobacco. He reports current alcohol use. He reports that he does not use drugs. PHYSICAL EXAM     INITIAL VITALS:  oral temperature is 98.1 °F (36.7 °C). His blood pressure is 129/85 and his pulse is 76. His respiration is 20 and oxygen saturation is 98%. Physical Exam  Vitals signs and nursing note reviewed. Constitutional:       General: He is not in acute distress. Appearance: He is well-developed. He is not diaphoretic. HENT:      Head: Normocephalic and atraumatic. Right Ear: External ear normal.      Left Ear: External ear normal.      Nose: Nose normal.   Eyes:      General: Lids are normal. No scleral icterus. Right eye: No discharge. Left eye: No discharge. Conjunctiva/sclera: Conjunctivae normal.      Right eye: No exudate. Left eye: No exudate. Pupils: Pupils are equal, round, and reactive to light.    Neck:      Musculoskeletal: Normal range of motion and neck supple. Normal range of motion. Thyroid: No thyromegaly. Vascular: No JVD. Trachea: No tracheal deviation. Cardiovascular:      Rate and Rhythm: Normal rate and regular rhythm. Pulses: Normal pulses. Heart sounds: Normal heart sounds, S1 normal and S2 normal. No murmur. No friction rub. No gallop. Pulmonary:      Effort: Pulmonary effort is normal. No respiratory distress. Breath sounds: Normal breath sounds. No stridor. No wheezing or rales. Chest:      Chest wall: No tenderness. Abdominal:      General: Bowel sounds are normal. There is no distension. Palpations: Abdomen is soft. There is no mass. Tenderness: There is no abdominal tenderness. There is no guarding or rebound. Genitourinary:     Penis: Circumcised. Testes: Normal. Cremasteric reflex is present. Comments: Priapism  Musculoskeletal: Normal range of motion. General: No tenderness. Right shoulder: He exhibits no tenderness, no bony tenderness, no crepitus and normal strength. Lymphadenopathy:      Cervical: No cervical adenopathy. Skin:     General: Skin is warm and dry. Findings: No bruising, ecchymosis, lesion or rash. Neurological:      Mental Status: He is alert and oriented to person, place, and time. Cranial Nerves: No cranial nerve deficit. Sensory: No sensory deficit. Coordination: Coordination normal.      Deep Tendon Reflexes: Reflexes are normal and symmetric. Psychiatric:         Speech: Speech normal.         Behavior: Behavior normal.         Thought Content:  Thought content normal.         Judgment: Judgment normal.           DIFFERENTIAL DIAGNOSIS:   Chemically induced priapism    DIAGNOSTIC RESULTS     EKG: All EKG's are interpreted by the Emergency Department Physician who either signs or Co-signs this chart in the absence of a cardiologist.  None    RADIOLOGY: non-plain film images(s) such as CT, Ultrasound and MRI are read by the radiologist.  No orders to display         LABS:   Labs Reviewed - No data to display    EMERGENCY DEPARTMENT COURSE:   Vitals:    Vitals:    01/21/21 0526 01/21/21 0601 01/21/21 0630 01/21/21 0658   BP: (!) 131/94 137/88 (!) 150/95 129/85   Pulse: 87 104 86 76   Resp: 20 20 20 20   Temp:       TempSrc:       SpO2: 97% 98% 98% 98%     Patient was assessed at bedside decision to treat was made. There has been a lot of literature using albuterol and exercise for this. An albuterol breathing treatment was ordered and the patient was told to walk around the room while he was getting this. Albuterol treatment and walking around did not work. At this point I feel the patient needs to have injections. I did order phenylephrine and lidocaine. I called and spoke with Dr. Lucrecia Burch discussed the case with him he will come in and drain the patient's penis. At this point I came to the end of my shift. Patient is signed out to my morning colleague in stable condition. CRITICAL CARE:   None    CONSULTS:  Dr Remi Hashimoto [facility]    PROCEDURES:  None    FINAL IMPRESSION      1. Priapism, drug-induced          DISPOSITION/PLAN   ED observation/signout    PATIENT REFERRED TO:  No follow-up provider specified.     DISCHARGE MEDICATIONS:  New Prescriptions    No medications on file       (Please note that portions of this note were completed with a voice recognition program.  Efforts were made to edit the dictations but occasionally words are mis-transcribed.)    Desi Espinoza, [address] [facility], DO  01/21/21 8803

## 2021-01-21 NOTE — ED NOTES
Urologist at bedside to complete procedure. Pt respirations easy and unlabored.       Birgit Johnson RN  01/21/21 7643

## 2021-01-21 NOTE — ED NOTES
Pt requesting something for pain. Pt updated on POC. Pt respirations easy and unlabored. Pt denies any further needs at this time.       Светлана Eric RN  01/21/21 0165

## 2021-01-21 NOTE — ED PROVIDER NOTES
Transfer of Care Note:   I have personally performed a face to face diagnostic evaluation on this patient. The patient's initial evaluation and plan have been discussed with the prior provider who initially evaluated the patient. Nursing Notes, Past Medical Hx, Past Surgical Hx, Social Hx, Allergies, and Family Hx were all reviewed. (Please note that portions of this note were completed with a voice recognition program.  Efforts were made to edit the dictations but occasionally words are mis-transcribed.)    7:28 AM EST: The patient was evaluated. Neo Castillo is a 41 y.o. male who presents to the Emergency Department for the evaluation of priapism. Exam:  Vital signs reviewed. Constitutional: Well-nourished, no distress evident. HEENT: Normocephalic, normal hearing, EOMI, speech clear. Neck: Soft supple. Trachea midline. Heart: Regular rate and rhythm. Lungs: Respiratory effort normal; CTAB  Abdomen: Nondistended; soft, nontender. Extremities: Well-perfused, movement normal as observed. Neuro: No focal deficits noted. Psych: Normal affect and behavior. RADIOLOGY: non-plain film images(s) such as CT, Ultrasound and MRI are read by the radiologist.  No orders to display       ED LABS:  Labs Reviewed - No data to display    MDM:  Patient arrived with priapism, unable to achieve relief in the ED. Dr. Prudy Claude at bedside to treat patient, awaiting final disposition after urologic interventions. Urology achieved success and patient is ok to go home. FINAL IMPRESSION      1. Priapism, drug-induced        Care of this patient was transferred from Dr. Susanne Larsen to myself at shift change. Provider:  I personally performed the services described in the documentation, reviewed and edited the documentation which was dictated in my presence, and it accurately records my words and actions.     Anna Hamilton MD 1/21/21 7:28 AM      Ruby London MD  01/21/21 0089

## 2021-01-27 ENCOUNTER — OFFICE VISIT (OUTPATIENT)
Dept: UROLOGY | Age: 41
End: 2021-01-27
Payer: MEDICAID

## 2021-01-27 VITALS — WEIGHT: 215 LBS | BODY MASS INDEX: 32.58 KG/M2 | TEMPERATURE: 98.1 F | HEIGHT: 68 IN

## 2021-01-27 DIAGNOSIS — N52.03 COMBINED ARTERIAL INSUFFICIENCY AND CORPORO-VENOUS OCCLUSIVE ERECTILE DYSFUNCTION: Primary | ICD-10-CM

## 2021-01-27 PROCEDURE — G8427 DOCREV CUR MEDS BY ELIG CLIN: HCPCS | Performed by: UROLOGY

## 2021-01-27 PROCEDURE — G8484 FLU IMMUNIZE NO ADMIN: HCPCS | Performed by: UROLOGY

## 2021-01-27 PROCEDURE — 99213 OFFICE O/P EST LOW 20 MIN: CPT | Performed by: UROLOGY

## 2021-01-27 PROCEDURE — G8417 CALC BMI ABV UP PARAM F/U: HCPCS | Performed by: UROLOGY

## 2021-01-27 PROCEDURE — 1036F TOBACCO NON-USER: CPT | Performed by: UROLOGY

## 2021-03-01 ENCOUNTER — HOSPITAL ENCOUNTER (EMERGENCY)
Age: 41
Discharge: HOME | End: 2021-03-01
Attending: EMERGENCY MEDICINE
Payer: MEDICAID

## 2021-03-01 VITALS
WEIGHT: 210 LBS | OXYGEN SATURATION: 98 % | DIASTOLIC BLOOD PRESSURE: 99 MMHG | SYSTOLIC BLOOD PRESSURE: 129 MMHG | HEIGHT: 68 IN | HEART RATE: 78 BPM | RESPIRATION RATE: 18 BRPM | TEMPERATURE: 98.2 F | BODY MASS INDEX: 31.83 KG/M2

## 2021-03-01 DIAGNOSIS — N48.33 PRIAPISM, DRUG-INDUCED: Primary | ICD-10-CM

## 2021-03-01 PROCEDURE — 6370000000 HC RX 637 (ALT 250 FOR IP): Performed by: STUDENT IN AN ORGANIZED HEALTH CARE EDUCATION/TRAINING PROGRAM

## 2021-03-01 PROCEDURE — 99284 EMERGENCY DEPT VISIT MOD MDM: CPT

## 2021-03-01 PROCEDURE — 2500000003 HC RX 250 WO HCPCS

## 2021-03-01 PROCEDURE — 2580000003 HC RX 258: Performed by: EMERGENCY MEDICINE

## 2021-03-01 PROCEDURE — 6360000002 HC RX W HCPCS: Performed by: EMERGENCY MEDICINE

## 2021-03-01 PROCEDURE — 2500000003 HC RX 250 WO HCPCS: Performed by: EMERGENCY MEDICINE

## 2021-03-01 RX ORDER — LIDOCAINE HYDROCHLORIDE 10 MG/ML
INJECTION, SOLUTION EPIDURAL; INFILTRATION; INTRACAUDAL; PERINEURAL
Status: COMPLETED
Start: 2021-03-01 | End: 2021-03-01

## 2021-03-01 RX ORDER — BUPIVACAINE HYDROCHLORIDE 2.5 MG/ML
30 INJECTION, SOLUTION INFILTRATION; PERINEURAL ONCE
Status: COMPLETED | OUTPATIENT
Start: 2021-03-01 | End: 2021-03-01

## 2021-03-01 RX ORDER — BUPIVACAINE HYDROCHLORIDE 5 MG/ML
INJECTION, SOLUTION EPIDURAL; INTRACAUDAL
Status: COMPLETED
Start: 2021-03-01 | End: 2021-03-01

## 2021-03-01 RX ORDER — OXYCODONE HYDROCHLORIDE AND ACETAMINOPHEN 5; 325 MG/1; MG/1
1 TABLET ORAL ONCE
Status: COMPLETED | OUTPATIENT
Start: 2021-03-01 | End: 2021-03-01

## 2021-03-01 RX ORDER — LIDOCAINE HYDROCHLORIDE AND EPINEPHRINE 10; 10 MG/ML; UG/ML
20 INJECTION, SOLUTION INFILTRATION; PERINEURAL ONCE
Status: COMPLETED | OUTPATIENT
Start: 2021-03-01 | End: 2021-03-01

## 2021-03-01 RX ADMIN — BUPIVACAINE HYDROCHLORIDE 50 MG: 5 INJECTION, SOLUTION EPIDURAL; INTRACAUDAL at 04:37

## 2021-03-01 RX ADMIN — LIDOCAINE HYDROCHLORIDE: 10 INJECTION, SOLUTION EPIDURAL; INFILTRATION; INTRACAUDAL at 04:59

## 2021-03-01 RX ADMIN — BUPIVACAINE HYDROCHLORIDE 75 MG: 2.5 INJECTION, SOLUTION INFILTRATION; PERINEURAL at 04:59

## 2021-03-01 RX ADMIN — OXYCODONE HYDROCHLORIDE AND ACETAMINOPHEN 1 TABLET: 5; 325 TABLET ORAL at 04:56

## 2021-03-01 RX ADMIN — LIDOCAINE HYDROCHLORIDE,EPINEPHRINE BITARTRATE 20 ML: 10; .01 INJECTION, SOLUTION INFILTRATION; PERINEURAL at 04:37

## 2021-03-01 RX ADMIN — PHENYLEPHRINE HYDROCHLORIDE: 10 INJECTION INTRAVENOUS at 04:37

## 2021-03-01 ASSESSMENT — ENCOUNTER SYMPTOMS
CONSTIPATION: 0
EYE DISCHARGE: 0
BACK PAIN: 0
DIARRHEA: 0
EYE PAIN: 0
SORE THROAT: 0
WHEEZING: 0
VOMITING: 0
COLOR CHANGE: 0
SINUS PRESSURE: 0
SINUS PAIN: 0
RHINORRHEA: 0
COUGH: 0
ABDOMINAL PAIN: 0
CHEST TIGHTNESS: 0
SHORTNESS OF BREATH: 0
TROUBLE SWALLOWING: 0

## 2021-03-01 ASSESSMENT — PAIN SCALES - GENERAL
PAINLEVEL_OUTOF10: 0
PAINLEVEL_OUTOF10: 8

## 2021-03-01 NOTE — ED PROVIDER NOTES
I performed a history and physical examination of the patient and discussed management with the resident. I reviewed the residents note and agree with the documented findings and plan of care. Any areas of disagreement are noted on the chart. I was personally present for the key portions of any procedures. I have documented in the chart those procedures where I was not present during the key portions. I have reviewed the emergency nurses triage note. I agree with the chief complaint, past medical history, past surgical history, allergies, medications, social and family history as documented unless otherwise noted below. Documentation of the HPI, Physical Exam and Medical Decision Making performed by medical students or scribes is based on my personal performance of the HPI, PE and MDM. For Phys Assistant/ Nurse Practitioner cases/documentation I have personally evaluated this patient and have completed at least one if not all key elements of the E/M (history, physical exam, and MDM). My findings are as noted below         Patient presents for preop is in. Drug-induced. Onset approximately midnight. Tonight he states that every time he injects on the right side he never has problems but when he injects on the left side he starts having these. Present problems. He had his medications adjusted by Dr. Laura Tamayo. Tonight he used his Trymex, subsequently had a longer than normal lasting erection. He came in here for evaluation and treatment. I did speak with Dr Tari Abraham, with urology and he requested that we attempt evacuation first.     Using both iodine and Shur-Clens the shaft of the penis in the area around the mons as well as the scrotum was cleaned thoroughly and allowed to dry. Then using a 50-50 block of lidocaine and Marcaine I made circumferential penile shaft blocks for the penis.   I waited approximately 10 minutes and achieved significant anesthesia to the penis, then using an 18-gauge needle and phenylephrine I advanced from the 3 o'clock position straight across to injecting as I went. I used approximately 5 mils of the phenylephrine. Then I disengage the needle and put on a IV block and allowed it to drain. I advanced the needle and then pulled it back draining both cavities. I got about 20 cc of blood out. I then engaged the phenylephrine again using 1 mm I advanced slowly through both cavities and then disengage the needle from the penis. Patient had significant decrease of tumescence. We then cleaned the area again and placed a 4 x 4 on it placed a towel across top of that and then placed an ice bag on that. I waited approximately 45 minutes and went back in, patient had achieved resolution of his priapism. Patient is instructed to follow-up with urology.      Aspen Rivera DO  03/02/21 0140

## 2021-03-01 NOTE — ED TRIAGE NOTES
To ED from home with complaints of taking his erectile dysfunction medication around midnight and still having an erection at this time. Pt denies pain. Pt states that a month ago this happened and he had to get his penis drained. Pt vital signs stable and respirations unlabored.

## 2021-03-01 NOTE — ED NOTES
Pt medicated per MAR.  Dr Nakul Somers and Karyn bedside for procedure     Beverley Steen  03/01/21 9725

## 2021-03-01 NOTE — ED NOTES
Medications and supplies bedside,  [facility] notified     Saman Alt  03/01/21 9041

## 2021-03-01 NOTE — ED PROVIDER NOTES
[address] [address]          Pt Name: Robbie Lacy  MRN: [id number]  [place] 1980  Date of evaluation: 3/1/2021  Treating Resident Physician: Stephania Moya DO  Supervising Physician: Mayco Dailey       Chief Complaint   Patient presents with    Erectile Dysfunction     History obtained from the patient. HISTORY OF PRESENT ILLNESS    HPI  Robbie Lacy is a 41 y.o. male who presents to the emergency department for evaluation of priapism. Patient utilized erectile dysfunction injection at approximately midnight. He is followed by urology with frequent adjustments of his medication as this is the third time being seen in this emergency department for this complaint. He complains of mild associated penile and scrotal pain. No swelling, fevers, discharge. The patient has no other acute complaints at this time. REVIEW OF SYSTEMS   Review of Systems   Constitutional: Negative for activity change, chills and fever. HENT: Negative for ear discharge, ear pain, nosebleeds, postnasal drip, rhinorrhea, sinus pressure, sinus pain, sore throat and trouble swallowing. Eyes: Negative for pain, discharge and visual disturbance. Respiratory: Negative for cough, chest tightness, shortness of breath and wheezing. Cardiovascular: Negative for chest pain and palpitations. Gastrointestinal: Negative for abdominal pain, constipation, diarrhea and vomiting. Endocrine: Negative for cold intolerance and heat intolerance. Genitourinary: Positive for difficulty urinating, penile pain and testicular pain. Negative for decreased urine volume, dysuria, flank pain and urgency. Musculoskeletal: Negative for arthralgias, back pain, neck pain and neck stiffness. Skin: Negative for color change and rash. Neurological: Negative for dizziness, weakness, light-headedness, numbness and headaches.          PAST MEDICAL AND SURGICAL HISTORY History reviewed. No pertinent past medical history. History reviewed. No pertinent surgical history. MEDICATIONS   No current facility-administered medications for this encounter. Current Outpatient Medications:     sodium chloride (PF) 0.9 % SOLN 10 mL with papaverine 30 MG/ML SOLN 50 mg, phentolamine mesylate 5 MG/ML SOLN 500 mcg, Prostaglandin E1 POWD 100 mg, Inject as directed as needed, Disp: , Rfl:     sodium chloride (PF) 0.9 % SOLN 10 mL with papaverine 30 MG/ML SOLN 50 mg, phentolamine mesylate 5 MG/ML SOLN 500 mcg, Prostaglandin E1 POWD 100 mg, Inject as directed as needed, Disp: , Rfl:     ibuprofen (IBU) 400 MG tablet, Take 1 tablet by mouth every 6 hours as needed for Pain, Disp: 120 tablet, Rfl: 0      SOCIAL HISTORY     Social History     Social History Narrative    Not on file     Social History     Tobacco Use    Smoking status: Former Smoker     Quit date:      Years since quittin.1    Smokeless tobacco: Never Used   Substance Use Topics    Alcohol use: Yes     Comment: social     Drug use: Never         ALLERGIES   No Known Allergies      FAMILY HISTORY     Family History   Problem Relation Age of Onset    Heart Disease Mother          PREVIOUS RECORDS   Previous records reviewed: Patient with multiple visits here for priapism. Mey Chavez PHYSICAL EXAM     ED Triage Vitals [21]   BP Temp Temp Source Pulse Resp SpO2 Height Weight   (!) 129/99 98.2 °F (36.8 °C) Oral 78 18 98 % 5' 8\" (1.727 m) 210 lb (95.3 kg)     Initial vital signs and nursing assessment reviewed and normal. Body mass index is 31.93 kg/m². Pulsoximetry is normal per my interpretation. Additional Vital Signs:  Vitals:    21   BP: (!) 129/99   Pulse: 78   Resp: 18   Temp: 98.2 °F (36.8 °C)   SpO2: 98%       Physical Exam  Exam conducted with a chaperone present. Constitutional:       General: He is not in acute distress. Appearance: Normal appearance.  He is not ill-appearing or diaphoretic. HENT:      Head: Normocephalic and atraumatic. Mouth/Throat:      Mouth: Mucous membranes are moist.      Pharynx: Oropharynx is clear. Eyes:      Extraocular Movements: Extraocular movements intact. Conjunctiva/sclera: Conjunctivae normal.   Cardiovascular:      Rate and Rhythm: Normal rate and regular rhythm. Pulses: Normal pulses. Heart sounds: Normal heart sounds. No murmur. No friction rub. No gallop. Pulmonary:      Effort: Pulmonary effort is normal.      Breath sounds: Normal breath sounds. No wheezing, rhonchi or rales. Abdominal:      Palpations: Abdomen is soft. Tenderness: There is no abdominal tenderness. There is no guarding or rebound. Genitourinary:     Penis: Circumcised. Tenderness present. Comments: Erect penis. No edema, induration, discharge. Musculoskeletal: Normal range of motion. General: No swelling. Right lower leg: No edema. Left lower leg: No edema. Skin:     General: Skin is warm and dry. Capillary Refill: Capillary refill takes less than 2 seconds. Findings: No bruising, erythema or lesion. Neurological:      General: No focal deficit present. Mental Status: He is alert and oriented to person, place, and time. Cranial Nerves: No cranial nerve deficit. Sensory: No sensory deficit. Motor: No weakness. Please see attending physician's note for procedure. MEDICAL DECISION MAKING   Initial Assessment:   1. Pleasant 42-year-old male. History of priapism requiring drainage. Erectile dysfunction injection at midnight. Presenting 4 1/2 hours of erection. Starting to have penile shaft and testicular pain. Plan:    Circumferential penile block. Pain control oral Norco.  Shaft injection with phenylephrine and subsequent corpuscle drainage with 18-gauge needle. Evacuated approximately 20 cc of dark blood. Successfully reduced erection.   Patient with significantly

## 2021-03-15 ENCOUNTER — TELEPHONE (OUTPATIENT)
Dept: UROLOGY | Age: 41
End: 2021-03-15

## 2021-03-15 NOTE — TELEPHONE ENCOUNTER
Patient has a follow up on 3/23/21 at 8:30 am this was r/s from 2/4/21. Patient was in the ER on 3/1/21 for priapism. His follow up is for injections, do you still want the injections to be done or are you just wanting to speak to the patient. Please advise.

## 2021-04-01 ENCOUNTER — OFFICE VISIT (OUTPATIENT)
Dept: UROLOGY | Age: 41
End: 2021-04-01
Payer: MEDICAID

## 2021-04-01 VITALS — WEIGHT: 215.7 LBS | HEIGHT: 68 IN | BODY MASS INDEX: 32.69 KG/M2 | TEMPERATURE: 97.1 F

## 2021-04-01 DIAGNOSIS — N52.03 COMBINED ARTERIAL INSUFFICIENCY AND CORPORO-VENOUS OCCLUSIVE ERECTILE DYSFUNCTION: Primary | ICD-10-CM

## 2021-04-01 PROCEDURE — 1036F TOBACCO NON-USER: CPT | Performed by: UROLOGY

## 2021-04-01 PROCEDURE — G8427 DOCREV CUR MEDS BY ELIG CLIN: HCPCS | Performed by: UROLOGY

## 2021-04-01 PROCEDURE — G8417 CALC BMI ABV UP PARAM F/U: HCPCS | Performed by: UROLOGY

## 2021-04-01 PROCEDURE — 99212 OFFICE O/P EST SF 10 MIN: CPT | Performed by: UROLOGY

## 2021-04-01 NOTE — PROGRESS NOTES
47-year-old black male returns today for follow-up regarding self injection therapy with Trimix. He has had 3 episodes of iatrogenic priapism. The current Trimix solution contains 29 mg of papaverine, 1 mg of phentolamine and 10 mcg of PGE. He has developed priapism using 15 units and 25 units of the above solution. He was last in the emergency department on 3/1/2021. He was treated with corporal irrigations and phenylephrine injection. We reviewed what seems to occur: He gets a normal erection but it only lasts for minutes before he loses the erection, consistent with venoocclusive dysfunction ED. He has tried the rings to trap the blood in the corpora but he says even the smallest ring was unable to keep firm erection and it was also painful. I recommended we change the Trimix to Trimix III which is 17.65 mg of papaverine, 0.59 mg of phentolamine, and 5.9 mcg of PGE1/mL. He will  the vial in [facility]. The form with the appropriate information was sent to [facility] medical today via fax.

## 2021-04-21 ENCOUNTER — HOSPITAL ENCOUNTER (EMERGENCY)
Age: 41
Discharge: HOME | End: 2021-04-21
Attending: EMERGENCY MEDICINE
Payer: MEDICAID

## 2021-04-21 VITALS
TEMPERATURE: 98.3 F | SYSTOLIC BLOOD PRESSURE: 126 MMHG | OXYGEN SATURATION: 94 % | HEART RATE: 68 BPM | DIASTOLIC BLOOD PRESSURE: 76 MMHG | WEIGHT: 215 LBS | RESPIRATION RATE: 18 BRPM | HEIGHT: 68 IN | BODY MASS INDEX: 32.58 KG/M2

## 2021-04-21 DIAGNOSIS — N48.30 PRIAPISM: Primary | ICD-10-CM

## 2021-04-21 PROCEDURE — 6360000002 HC RX W HCPCS: Performed by: EMERGENCY MEDICINE

## 2021-04-21 PROCEDURE — 99284 EMERGENCY DEPT VISIT MOD MDM: CPT

## 2021-04-21 PROCEDURE — 96372 THER/PROPH/DIAG INJ SC/IM: CPT

## 2021-04-21 PROCEDURE — 6370000000 HC RX 637 (ALT 250 FOR IP): Performed by: EMERGENCY MEDICINE

## 2021-04-21 RX ORDER — TERBUTALINE SULFATE 1 MG/ML
0.5 INJECTION, SOLUTION SUBCUTANEOUS ONCE
Status: COMPLETED | OUTPATIENT
Start: 2021-04-21 | End: 2021-04-21

## 2021-04-21 RX ORDER — HYDROCODONE BITARTRATE AND ACETAMINOPHEN 5; 325 MG/1; MG/1
1 TABLET ORAL ONCE
Status: COMPLETED | OUTPATIENT
Start: 2021-04-21 | End: 2021-04-21

## 2021-04-21 RX ADMIN — HYDROCODONE BITARTRATE AND ACETAMINOPHEN 1 TABLET: 5; 325 TABLET ORAL at 05:08

## 2021-04-21 RX ADMIN — TERBUTALINE SULFATE 0.5 MG: 1 INJECTION, SOLUTION SUBCUTANEOUS at 05:36

## 2021-04-21 ASSESSMENT — ENCOUNTER SYMPTOMS
RHINORRHEA: 0
EYE PAIN: 0
EYE ITCHING: 0
WHEEZING: 0
EYE DISCHARGE: 0
VOMITING: 0
STRIDOR: 0
CONSTIPATION: 0
CHEST TIGHTNESS: 0
COUGH: 0
BACK PAIN: 0
PHOTOPHOBIA: 0
SHORTNESS OF BREATH: 0
EYE REDNESS: 0
SORE THROAT: 0
ABDOMINAL DISTENTION: 0
NAUSEA: 0
ABDOMINAL PAIN: 0
DIARRHEA: 0

## 2021-04-21 ASSESSMENT — PAIN DESCRIPTION - PAIN TYPE: TYPE: ACUTE PAIN

## 2021-04-21 ASSESSMENT — PAIN SCALES - GENERAL
PAINLEVEL_OUTOF10: 5
PAINLEVEL_OUTOF10: 6
PAINLEVEL_OUTOF10: 7

## 2021-04-21 ASSESSMENT — PAIN DESCRIPTION - LOCATION: LOCATION: PENIS

## 2021-04-21 NOTE — ED PROVIDER NOTES
[facility]  EMERGENCY DEPARTMENT ENCOUNTER      PATIENT NAME: Mela Cedillo  MRN: [id number]  : 1980  FERNANDES: 2021  PROVIDER: Charlane Boas, MD      CHIEF COMPLAINT       Chief Complaint   Patient presents with    Other     priaprism        Nurses Notes reviewed and I agree except as noted in the HPI. HISTORY OF PRESENT ILLNESS    Mela Cedillo is a 35 y.o. male who presents to Emergency Department with Other (priaprism )    Onset: Gradual  Location: At home  Quality: Painful penile erection  Radiation: None  Severity: Moderate  Timing: Over last 3 hours  Modifying factors: None. Nothing makes erection better  Duration: Persistent  Context: Patient has history of ED, he uses Trimix. He injected Trimix around 1 AM, he has been having persistent erection ever since then. Patient says this is a recurrent problem. He begins to feel penile pain. Current pain intensity is at 5/10. This HPI was provided by the patient. REVIEW OF SYSTEMS     Review of Systems   Constitutional: Negative for activity change, appetite change, chills, fatigue, fever and unexpected weight change. HENT: Negative for congestion, ear discharge, ear pain, hearing loss, nosebleeds, rhinorrhea and sore throat. Eyes: Negative for photophobia, pain, discharge, redness and itching. Respiratory: Negative for cough, chest tightness, shortness of breath, wheezing and stridor. Cardiovascular: Negative for chest pain, palpitations and leg swelling. Gastrointestinal: Negative for abdominal distention, abdominal pain, constipation, diarrhea, nausea and vomiting. Endocrine: Negative for cold intolerance, heat intolerance, polydipsia and polyphagia. Genitourinary: Negative for dysuria, flank pain, frequency and hematuria. Painful erection   Musculoskeletal: Negative for arthralgias, back pain, gait problem, myalgias, neck pain and neck stiffness. Skin: Negative for pallor, rash and wound. Allergic/Immunologic: Negative for environmental allergies and food allergies. Neurological: Negative for dizziness, tremors, syncope, weakness and headaches. Psychiatric/Behavioral: Negative for agitation, behavioral problems, confusion, self-injury, sleep disturbance and suicidal ideas. PAST MEDICAL HISTORY   History reviewed. No pertinent past medical history. SURGICAL HISTORY     History reviewed. No pertinent surgical history. CURRENT MEDICATIONS       Previous Medications    No medications on file       ALLERGIES     Patient has no known allergies. FAMILY HISTORY     He indicated that his mother is alive. He indicated that his father is . family history includes Heart Disease in his mother. SOCIAL HISTORY      reports that he quit smoking about 20 years ago. He has never used smokeless tobacco. He reports current alcohol use. He reports that he does not use drugs. PHYSICAL EXAM     INITIAL VITALS:    height is 5' 8\" (1.727 m) and weight is 215 lb (97.5 kg). His oral temperature is 98.3 °F (36.8 °C). His blood pressure is 126/76 and his pulse is 68. His respiration is 18 and oxygen saturation is 94%. Physical Exam  Vitals signs and nursing note reviewed. Constitutional:       Appearance: He is well-developed. He is not diaphoretic. HENT:      Head: Normocephalic and atraumatic. Nose: Nose normal.   Eyes:      General: No scleral icterus. Right eye: No discharge. Left eye: No discharge. Conjunctiva/sclera: Conjunctivae normal.      Pupils: Pupils are equal, round, and reactive to light. Neck:      Musculoskeletal: Normal range of motion and neck supple. Vascular: No JVD. Trachea: No tracheal deviation. Cardiovascular:      Rate and Rhythm: Normal rate and regular rhythm. Heart sounds: Normal heart sounds. No murmur. No friction rub. No gallop. Pulmonary:      Effort: Pulmonary effort is normal. No respiratory distress. 0.5 mg SC. He becomes better and achieves complete detumescence after one hour. Discharged with Urology follow up in three days. CRITICAL CARE:   None    CONSULTS:  Urology on call, Erroll Better    PROCEDURES:  None    RE-EXAMINATION AND RE-EVALUATION   Stable and feeling better. VITALS IN ED  Vitals:    04/21/21 0445 04/21/21 0539 04/21/21 0642   BP: 121/77 114/73 126/76   Pulse: 74 63 68   Resp: 18 18 18   Temp: 98.3 °F (36.8 °C)     TempSrc: Oral     SpO2: 94% 97% 94%   Weight: 215 lb (97.5 kg)     Height: 5' 8\" (1.727 m)         FINAL IMPRESSION      1.  Priapism          DISPOSITION/PLAN   Plan to discharge home    PATIENT REFERRED TO:  Deni Estrella MD  [place] [address]    In 3 days  ED discharge follow up      [address]:  New Prescriptions    No medications on file       (Please note that portions of this note were completed with a voice recognition program.  Efforts were made to edit the dictations but occasionally words aremis-transcribed.)    MD Tracey Emerson MD  04/21/21 5381

## 2021-04-21 NOTE — ED NOTES
Pt arrives to the ED for priaprism. Pt states he took trimax about 4 hours ago and remains to have an erection. Pt states he has had this in the past and states his physician decreased his does in half. Pt states he has used the medication 3 times prior. Pt rates his pain a 6/10 at this time.       Gilad Drummond, USMAN  04/21/21 [address], RN  04/21/21 2439

## 2021-04-21 NOTE — ED NOTES
ED nurse-to-nurse bedside report    Chief Complaint   Patient presents with    Other     priaprism       LOC: alert and orientated to name, place, date  Vital signs   Vitals:    04/21/21 0445 04/21/21 0539 04/21/21 0642   BP: 121/77 114/73 126/76   Pulse: 74 63 68   Resp: 18 18 18   Temp: 98.3 °F (36.8 °C)     TempSrc: Oral     SpO2: 94% 97% 94%   Weight: 215 lb (97.5 kg)     Height: 5' 8\" (1.727 m)        Pain:    Pain Interventions: norco  Pain Goal: 5  Oxygen: No    Current needs required room air    Telemetry: No  LDAs:    Continuous Infusions:   Mobility: Independent  [place] Fall Risk Score: No flowsheet data found. Fall Interventions: low risk   Report given to:  Tere Rodas, RN  04/21/21 2779

## 2021-04-21 NOTE — LETTER
[facility] [address] [address] EMERGENCY DEPT  [address] [place] [address]  Phone: [phone]               April 21, 2021    Patient: Colette Sanders   YOB: 1980   Date of Visit: 4/21/2021       To Whom It May Concern:    Colette Sanders was seen and treated in our emergency department on 4/21/2021. He may return to work on 4/22/2021.       Sincerely,       Elias Reid MD         Signature:__________________________________

## 2021-04-21 NOTE — ED NOTES
Pt erection has decreased slightly. Dr. Babita Lomax at bedside to assess pt.      Edenilson Saul RN  04/21/21 8324

## 2021-04-21 NOTE — ED NOTES
Pt. Resting in bed with even and unlabored respirations. Pt. States his pain has decreased to a 5/10. Pt medicated per MAR. Pt. Updated about plan of care and treatment. Pt. Has no further concerns, questions or needs at this time. Call light within reach.         Ruslan Gibbs RN  04/21/21 1740

## 2021-04-21 NOTE — ED NOTES
Pt. Resting in bed with even and unlabored respirations. Pt. States pain remains a 5/10 at this time. Pt. Updated about plan of care and treatment. Pt. Has no further concerns, questions or needs at this time. Call light within reach.         Thelma Wahl RN  04/21/21 7164

## 2021-09-09 ENCOUNTER — HOSPITAL ENCOUNTER (EMERGENCY)
Age: 41
Discharge: HOME | End: 2021-09-10
Payer: MEDICAID

## 2021-09-09 VITALS
HEART RATE: 73 BPM | WEIGHT: 215 LBS | BODY MASS INDEX: 32.58 KG/M2 | OXYGEN SATURATION: 97 % | HEIGHT: 68 IN | DIASTOLIC BLOOD PRESSURE: 74 MMHG | TEMPERATURE: 98 F | RESPIRATION RATE: 16 BRPM | SYSTOLIC BLOOD PRESSURE: 129 MMHG

## 2021-09-09 DIAGNOSIS — Z20.822 LAB TEST NEGATIVE FOR COVID-19 VIRUS: ICD-10-CM

## 2021-09-09 DIAGNOSIS — Z20.822 CLOSE EXPOSURE TO COVID-19 VIRUS: Primary | ICD-10-CM

## 2021-09-09 PROCEDURE — 87635 SARS-COV-2 COVID-19 AMP PRB: CPT

## 2021-09-09 PROCEDURE — 99282 EMERGENCY DEPT VISIT SF MDM: CPT

## 2021-09-10 LAB — SARS-COV-2, NAAT: NOT DETECTED

## 2021-09-10 NOTE — ED PROVIDER NOTES
[facility]  eMERGENCY dEPARTMENT eNCOUnter          CHIEF COMPLAINT       Chief Complaint   Patient presents with    Covid Testing       Nurses Notes reviewed and I agree except as noted inthe HPI. HISTORY OF PRESENT ILLNESS    Adrienne Smith is a 35 y.o. male who presents to the Emergency Department for the evaluation of Covid testing. Patient states he has not had any symptoms and is unvaccinated. However, daughter tested positive on home test today after exposure to positive classmate so the family decided to come in and be tested. Patient reports his home test was negative. Patient denied any other complaints at this time. The HPI was provided by the patient. REVIEW OF SYSTEMS     Review of Systems   All other systems reviewed and are negative. PAST MEDICAL HISTORY    has no past medical history on file. SURGICAL HISTORY      has no past surgical history on file. CURRENT MEDICATIONS     There are no discharge medications for this patient. ALLERGIES     has No Known Allergies. FAMILY HISTORY     He indicated that his mother is alive. He indicated that his father is . family history includes Heart Disease in his mother. SOCIAL HISTORY      reports that he quit smoking about 20 years ago. He has never used smokeless tobacco. He reports current alcohol use. He reports that he does not use drugs. PHYSICAL EXAM     INITIAL VITALS:  height is 5' 8\" (1.727 m) and weight is 215 lb (97.5 kg). His oral temperature is 98 °F (36.7 °C). His blood pressure is 129/74 and his pulse is 73. His respiration is 16 and oxygen saturation is 97%. Physical Exam  Vitals and nursing note reviewed. Constitutional:       Appearance: Normal appearance. HENT:      Head: Normocephalic. Eyes:      Conjunctiva/sclera: Conjunctivae normal.   Cardiovascular:      Rate and Rhythm: Normal rate.    Pulmonary:      Effort: Pulmonary effort is normal. No respiratory distress. Musculoskeletal:      Cervical back: Normal range of motion. Skin:     General: Skin is dry. Neurological:      General: No focal deficit present. Mental Status: He is alert. Psychiatric:         Mood and Affect: Mood normal.         DIFFERENTIAL DIAGNOSIS:   Differential diagnoses are discussed    DIAGNOSTIC RESULTS     EKG: All EKG's are interpreted by the Emergency Department Physician who either signs or Co-signsthis chart in the absence of a cardiologist.        RADIOLOGY: non-plain film images(s) such as CT, Ultrasound and MRI are read by the radiologist.    No orders to display       LABS:      Labs Reviewed   COVID-19, RAPID       EMERGENCY DEPARTMENT COURSE:   Vitals:    Vitals:    09/09/21 2253 09/09/21 2255   BP:  129/74   Pulse:  73   Resp: 16    Temp: 98 °F (36.7 °C)    TempSrc: Oral    SpO2: 96% 97%   Weight: 215 lb (97.5 kg)    Height: 5' 8\" (1.727 m)       6:15 AM EDT: The patient was seen and evaluated. Patient presents with reassuring vital signs and no symptoms requesting Covid testing as daughter had positive home test today. Patient had negative home test today and repeat here was also negative. However, due to exposure to Covid positive daughter, timeframe of quarantine was discussed given his negative vaccination status. Return precautions were discussed and work note was provided. Patient denied further needs upon discharge. CRITICAL CARE:   None    CONSULTS:  None    PROCEDURES:  None    FINAL IMPRESSION      1. Close exposure to COVID-19 virus    2. Lab test negative for COVID-19 virus          DISPOSITION/PLAN   Discharge    PATIENT REFERRED TO:  JOHN Alejandro - YAZAN [address]  [phone]      As needed    [facility] [address] [address] EMERGENCY DEPT  [address]  [address] [facility]  [phone]    If symptoms worsen      DISCHARGEMEDICATIONS:  There are no discharge medications for this patient.       (Please note that portions of this note were completedwith a voice recognition program.  Efforts were made to edit the dictations but occasionally words are mis-transcribed.)       Jerry Renteria PA-C  09/10/21 2102

## 2022-07-18 NOTE — PROGRESS NOTES
PAT appointment reminder call  Arrival time and location given 7/25 at 12:30 in PAT  Bring drivers license and insurance  Bring list of medications with dosage and frequency  If possible bring caregiver for appointment  Appointment may last 2 hours

## 2022-07-25 ENCOUNTER — HOSPITAL ENCOUNTER (OUTPATIENT)
Dept: PREADMISSION TESTING | Age: 42
Discharge: HOME | End: 2022-07-29
Payer: MEDICAID

## 2022-07-25 ENCOUNTER — HOSPITAL ENCOUNTER (OUTPATIENT)
Dept: GENERAL RADIOLOGY | Age: 42
Discharge: HOME | End: 2022-07-25
Payer: MEDICAID

## 2022-07-25 DIAGNOSIS — M48.02 CERVICAL SPINAL STENOSIS: ICD-10-CM

## 2022-07-25 DIAGNOSIS — Z01.818 PREOP EXAMINATION: ICD-10-CM

## 2022-07-25 LAB
ANION GAP SERPL CALCULATED.3IONS-SCNC: 9 MEQ/L (ref 8–16)
APTT: 36.2 SECONDS (ref 22–38)
BUN BLDV-MCNC: 8 MG/DL (ref 7–22)
CALCIUM SERPL-MCNC: 9.3 MG/DL (ref 8.5–10.5)
CHLORIDE BLD-SCNC: 103 MEQ/L (ref 98–111)
CO2: 26 MEQ/L (ref 23–33)
CREAT SERPL-MCNC: 0.9 MG/DL (ref 0.4–1.2)
EKG ATRIAL RATE: 82 BPM
EKG P AXIS: 58 DEGREES
EKG P-R INTERVAL: 174 MS
EKG Q-T INTERVAL: 364 MS
EKG QRS DURATION: 94 MS
EKG QTC CALCULATION (BAZETT): 425 MS
EKG R AXIS: 65 DEGREES
EKG T AXIS: 43 DEGREES
EKG VENTRICULAR RATE: 82 BPM
ERYTHROCYTE [DISTWIDTH] IN BLOOD BY AUTOMATED COUNT: 11.5 % (ref 11.5–14.5)
ERYTHROCYTE [DISTWIDTH] IN BLOOD BY AUTOMATED COUNT: 39.9 FL (ref 35–45)
GFR SERPL CREATININE-BSD FRML MDRD: > 90 ML/MIN/1.73M2
GLUCOSE BLD-MCNC: 98 MG/DL (ref 70–108)
HCT VFR BLD CALC: 45.8 % (ref 42–52)
HEMOGLOBIN: 15.3 GM/DL (ref 14–18)
INR BLD: 1.09 (ref 0.85–1.13)
MCH RBC QN AUTO: 31.8 PG (ref 26–33)
MCHC RBC AUTO-ENTMCNC: 33.4 GM/DL (ref 32.2–35.5)
MCV RBC AUTO: 95.2 FL (ref 80–94)
MRSA NASAL SCREEN RT-PCR: NEGATIVE
PLATELET # BLD: 236 THOU/MM3 (ref 130–400)
PMV BLD AUTO: 10.1 FL (ref 9.4–12.4)
POTASSIUM SERPL-SCNC: 4 MEQ/L (ref 3.5–5.2)
RBC # BLD: 4.81 MILL/MM3 (ref 4.7–6.1)
SODIUM BLD-SCNC: 138 MEQ/L (ref 135–145)
STAPH AUREUS SCREEN RT-PCR: NEGATIVE
WBC # BLD: 5.9 THOU/MM3 (ref 4.8–10.8)

## 2022-07-25 PROCEDURE — 85730 THROMBOPLASTIN TIME PARTIAL: CPT

## 2022-07-25 PROCEDURE — 71046 X-RAY EXAM CHEST 2 VIEWS: CPT

## 2022-07-25 PROCEDURE — 80048 BASIC METABOLIC PNL TOTAL CA: CPT

## 2022-07-25 PROCEDURE — 93010 ELECTROCARDIOGRAM REPORT: CPT | Performed by: INTERNAL MEDICINE

## 2022-07-25 PROCEDURE — 87641 MR-STAPH DNA AMP PROBE: CPT

## 2022-07-25 PROCEDURE — 93005 ELECTROCARDIOGRAM TRACING: CPT | Performed by: ORTHOPAEDIC SURGERY

## 2022-07-25 PROCEDURE — 85027 COMPLETE CBC AUTOMATED: CPT

## 2022-07-25 PROCEDURE — 87640 STAPH A DNA AMP PROBE: CPT

## 2022-07-25 PROCEDURE — 36415 COLL VENOUS BLD VENIPUNCTURE: CPT

## 2022-07-25 RX ORDER — ACETAMINOPHEN 325 MG/1
650 TABLET ORAL EVERY 6 HOURS PRN
COMMUNITY

## 2022-07-25 RX ORDER — M-VIT,TX,IRON,MINS/CALC/FOLIC 27MG-0.4MG
1 TABLET ORAL DAILY
COMMUNITY

## 2022-07-25 RX ORDER — IBUPROFEN 800 MG/1
800 TABLET ORAL EVERY 6 HOURS PRN
COMMUNITY

## 2022-07-25 NOTE — PROGRESS NOTES
Cervical Surgery Pre-op Instructions  Nothing to eat or drink after midnight the night before surgery except sips of water to take medications  Bring photo ID and any health insurance cards  Wear comfortable clean loose fitting clothing  Do not wear any jewelry   Bring your medication in the original bottles  Bring your CPAP machine if you have one  Wear clean clothes to bed and place fresh clean sheets and pillowcases on your bed the night before surgery  Showering:  Shower 2 days before, day before and morning of surgery using the StartClean® kit provided (follow the instructions provided with the kit), OR   Do not shave the surgical area! If needed, your nurse will use clippers to remove any excess hair at the surgical site when you come in for surgery. Do not use a razor on the site of your surgery for at least 2 days prior to surgery because shaving can leave tiny nicks in the skin which may allow germs to enter and cause infection. If you have any questions about your preoperative preparations, please call the [address] [place] [address] at [phone]. Start Clean Shower Instructions          __8/10____ (date)   FIRST SHOWER: Two days before surgery: Take a shower and wash your entire body, including your hair and scalp in the following manner:  Wash your hair using normal shampoo. Make sure you rinse the shampoo from your hair and body. Wash your face with your regular soap or cleanser. Use one of the sponges in the [facility] kit and apply 1/3 of the Chlorhexidine soap to the sponge, wash from your neck down. This is very important. Do not use the soap on your face or hair and avoid private areas. Rinse your body thoroughly. This is very important. Using a fresh, clean towel, dry your body. Dress in freshly washed clothes.   Do not use lotions, powders, or creams after this shower. __8/11______ (date)   SECOND SHOWER: The day before surgery: Take a shower and wash your entire body, including your hair and scalp in the following manner:  Wash your hair using normal shampoo. Make sure you rinse the shampoo from your hair and body. Wash your face with your regular soap or cleanser. Use one of the sponges in the [facility] kit and apply 1/3 of the Chlorhexidine soap to the sponge, wash from your neck down. This is very important. Do not use the soap on your face or hair and avoid private areas. Rinse your body thoroughly. This is very important. Using a fresh, clean towel, dry your body. Dress in freshly washed clothes. Fresh clean sheets and pillow cases should be used after this shower. Do not use lotions, powders, or creams after this shower. __8/12______ (date)   THE FINAL SHOWER: The morning of surgery: Take a shower and wash your entire body, including your hair and scalp in the following manner:  Wash your hair using normal shampoo. Make sure you rinse the shampoo from your hair and body. Wash your face with your regular soap or cleanser. Use one of the sponges in the [facility] kit and apply 1/3 of the Chlorhexidine soap to the sponge, wash from your neck down. This is very important. Do not use the soap on your face or hair and avoid private areas. Rinse your body thoroughly. This is very important. Using a fresh, clean towel, dry your body. Dress warmly with freshly washed clean clothes. Keeping warm before surgery decreases your risk of developing and infection.

## 2022-07-25 NOTE — PROGRESS NOTES
Pain: Roque Downey has intermittent posterior neck pain - pressure at the base of neck. \"It feels like a weight pressing down on my head and shoulders\". He also has numbness of bilateral knees intermittently elio after sitting and leaning/ bending down or over. Pain is rated 4/10 today. Pain scale and pain management reviewed with understanding verbalized.

## 2022-08-11 NOTE — H&P
History and Physical    Bong Romero (1980)  8/11/2022      CHIEF COMPLAINT:  Neck pain    HISTORY OF PRESENT ILLNESS:    The patient is a 26-year-old male with complaints of constant neck pain that radiates pain into the right shoulder with left hand numbness/weakness. Patient reports symptoms began on 4/7/2021 when 3 metal guards feel onto his back while working at [employer]. Current VAS score 8/10. Percentage wise, 50% pain in the neck and 50% arms. As it pertains to the arms, 75% right and 25% left. Movement aggravates the pain. Rest helps relieve the pain. Modifying factors include medication management, PT, chiropractic treatment. These modalities have provided really no relief. No bowel or bladder incontinence. No previous spinal surgeries. Former smoker    PCP: Eugenia Brooks CNP    Past Medical History:    No past medical history on file. Past Surgical History:    No past surgical history on file. Current Medications:   Prior to Admission medications    Medication Sig Start Date End Date Taking? Authorizing Provider   acetaminophen (TYLENOL) 325 MG tablet Take 650 mg by mouth every 6 hours as needed for Pain    Historical Provider, MD   ibuprofen (ADVIL;MOTRIN) 800 MG tablet Take 800 mg by mouth every 6 hours as needed for Pain    Historical Provider, MD   Multiple Vitamins-Minerals (THERAPEUTIC MULTIVITAMIN-MINERALS) tablet Take 1 tablet by mouth in the morning. Historical Provider, MD    D@  Allergies:  Patient has no known allergies. Social History:   TOBACCO:   reports that he quit smoking about 21 years ago. His smoking use included cigarettes. He has never used smokeless tobacco.  ETOH:   reports current alcohol use. DRUGS:   reports no history of drug use.   Family History:       Problem Relation Age of Onset    High Blood Pressure Mother     Heart Disease Mother     Stroke Father        REVIEW OF SYSTEMS:    CONSTITUTIONAL:  negative for fevers, chills  RESPIRATORY:  negative for cough and shortness of breath  CARDIOVASCULAR:  negative for chest pain, palpitations  GASTROINTESTINAL:  negative for nausea, vomiting, incontinence  GENITOURINARY:  negative for frequency and urinary incontinence  MUSCULOSKELETAL:  (+) for neck pain, arm pain  NEUROLOGICAL:  (+) for numbness/tingling, weakness  BEHAVIOR/PSYCH:  negative for depressed mood, increased anxiety    PHYSICAL EXAM:    VITAL SIGNS: Ht 5 ft 8 in, Wt 215 lbs, BMI 32.69.  CONSTITUTIONAL:  Awake, alert, cooperative, no apparent distress, and appears stated age  HEAD: Atraumatic, normocephalic  LUNGS:  No respiratory distress. CTA bilaterally. No wheezes, rales, rhonchi  CARDIOVASCULAR:  Regular rate and rhythm, no murmur  ABDOMEN:  Normal bowel sounds, soft, non-distended, non-tender  SPINE: Limited cervical ROM. Inspection of the spine shows no skin lesions or open wounds. TTP cervical spine. MUSCULOSKELETAL:  There is no redness, warmth, or swelling of the joints. Full range of motion noted. Motor strength is 5 out of 5 bilateral UE. NEUROLOGIC:  Awake, alert, oriented to name, place and time. Sensory is intact bilateral UE.      DATA:    CBC:   Lab Results   Component Value Date/Time    WBC 5.9 07/25/2022 12:30 PM    RBC 4.81 07/25/2022 12:30 PM    HGB 15.3 07/25/2022 12:30 PM    HCT 45.8 07/25/2022 12:30 PM    MCV 95.2 07/25/2022 12:30 PM    MCH 31.8 07/25/2022 12:30 PM    MCHC 33.4 07/25/2022 12:30 PM     07/25/2022 12:30 PM    MPV 10.1 07/25/2022 12:30 PM     WBC:    Lab Results   Component Value Date/Time    WBC 5.9 07/25/2022 12:30 PM     Hemoglobin/Hematocrit:    Lab Results   Component Value Date/Time    HGB 15.3 07/25/2022 12:30 PM    HCT 45.8 07/25/2022 12:30 PM     CMP:    Lab Results   Component Value Date/Time     07/25/2022 12:30 PM    K 4.0 07/25/2022 12:30 PM     07/25/2022 12:30 PM    CO2 26 07/25/2022 12:30 PM    BUN 8 07/25/2022 12:30 PM    CREATININE 0.9 07/25/2022 12:30 PM    LABGLOM >90 07/25/2022

## 2022-08-12 ENCOUNTER — ANESTHESIA (OUTPATIENT)
Dept: OPERATING ROOM | Age: 42
End: 2022-08-12
Payer: MEDICAID

## 2022-08-12 ENCOUNTER — HOSPITAL ENCOUNTER (OUTPATIENT)
Age: 42
Setting detail: OBSERVATION
Discharge: HOME | End: 2022-08-13
Attending: ORTHOPAEDIC SURGERY | Admitting: ORTHOPAEDIC SURGERY
Payer: MEDICAID

## 2022-08-12 ENCOUNTER — APPOINTMENT (OUTPATIENT)
Dept: GENERAL RADIOLOGY | Age: 42
End: 2022-08-12
Attending: ORTHOPAEDIC SURGERY
Payer: MEDICAID

## 2022-08-12 ENCOUNTER — ANESTHESIA EVENT (OUTPATIENT)
Dept: OPERATING ROOM | Age: 42
End: 2022-08-12
Payer: MEDICAID

## 2022-08-12 PROBLEM — M48.02 CERVICAL STENOSIS OF SPINE: Status: ACTIVE | Noted: 2022-08-12

## 2022-08-12 LAB
ABO: NORMAL
ANTIBODY SCREEN: NORMAL
RH FACTOR: NORMAL

## 2022-08-12 PROCEDURE — G0378 HOSPITAL OBSERVATION PER HR: HCPCS

## 2022-08-12 PROCEDURE — 7100000011 HC PHASE II RECOVERY - ADDTL 15 MIN: Performed by: ORTHOPAEDIC SURGERY

## 2022-08-12 PROCEDURE — 86850 RBC ANTIBODY SCREEN: CPT

## 2022-08-12 PROCEDURE — 6360000002 HC RX W HCPCS

## 2022-08-12 PROCEDURE — 6370000000 HC RX 637 (ALT 250 FOR IP): Performed by: PHYSICIAN ASSISTANT

## 2022-08-12 PROCEDURE — 2500000003 HC RX 250 WO HCPCS

## 2022-08-12 PROCEDURE — 2580000003 HC RX 258: Performed by: PHYSICIAN ASSISTANT

## 2022-08-12 PROCEDURE — 6360000002 HC RX W HCPCS: Performed by: PHYSICIAN ASSISTANT

## 2022-08-12 PROCEDURE — 7100000000 HC PACU RECOVERY - FIRST 15 MIN: Performed by: ORTHOPAEDIC SURGERY

## 2022-08-12 PROCEDURE — 7100000010 HC PHASE II RECOVERY - FIRST 15 MIN: Performed by: ORTHOPAEDIC SURGERY

## 2022-08-12 PROCEDURE — 3600000014 HC SURGERY LEVEL 4 ADDTL 15MIN: Performed by: ORTHOPAEDIC SURGERY

## 2022-08-12 PROCEDURE — 3700000001 HC ADD 15 MINUTES (ANESTHESIA): Performed by: ORTHOPAEDIC SURGERY

## 2022-08-12 PROCEDURE — 36415 COLL VENOUS BLD VENIPUNCTURE: CPT

## 2022-08-12 PROCEDURE — 2580000003 HC RX 258

## 2022-08-12 PROCEDURE — 2780000010 HC IMPLANT OTHER: Performed by: ORTHOPAEDIC SURGERY

## 2022-08-12 PROCEDURE — 2720000010 HC SURG SUPPLY STERILE: Performed by: ORTHOPAEDIC SURGERY

## 2022-08-12 PROCEDURE — 3600000004 HC SURGERY LEVEL 4 BASE: Performed by: ORTHOPAEDIC SURGERY

## 2022-08-12 PROCEDURE — L0120 CERV FLEX N/ADJ FOAM PRE OTS: HCPCS | Performed by: ORTHOPAEDIC SURGERY

## 2022-08-12 PROCEDURE — 86901 BLOOD TYPING SEROLOGIC RH(D): CPT

## 2022-08-12 PROCEDURE — 86900 BLOOD TYPING SEROLOGIC ABO: CPT

## 2022-08-12 PROCEDURE — 3209999900 FLUORO FOR SURGICAL PROCEDURES

## 2022-08-12 PROCEDURE — 2709999900 HC NON-CHARGEABLE SUPPLY: Performed by: ORTHOPAEDIC SURGERY

## 2022-08-12 PROCEDURE — 7100000001 HC PACU RECOVERY - ADDTL 15 MIN: Performed by: ORTHOPAEDIC SURGERY

## 2022-08-12 PROCEDURE — 3700000000 HC ANESTHESIA ATTENDED CARE: Performed by: ORTHOPAEDIC SURGERY

## 2022-08-12 PROCEDURE — 72040 X-RAY EXAM NECK SPINE 2-3 VW: CPT

## 2022-08-12 RX ORDER — ROCURONIUM BROMIDE 10 MG/ML
INJECTION, SOLUTION INTRAVENOUS PRN
Status: DISCONTINUED | OUTPATIENT
Start: 2022-08-12 | End: 2022-08-12 | Stop reason: SDUPTHER

## 2022-08-12 RX ORDER — SODIUM CHLORIDE 9 MG/ML
INJECTION, SOLUTION INTRAVENOUS PRN
Status: DISCONTINUED | OUTPATIENT
Start: 2022-08-12 | End: 2022-08-12 | Stop reason: HOSPADM

## 2022-08-12 RX ORDER — LIDOCAINE HYDROCHLORIDE 20 MG/ML
INJECTION, SOLUTION INFILTRATION; PERINEURAL PRN
Status: DISCONTINUED | OUTPATIENT
Start: 2022-08-12 | End: 2022-08-12 | Stop reason: SDUPTHER

## 2022-08-12 RX ORDER — ONDANSETRON 2 MG/ML
INJECTION INTRAMUSCULAR; INTRAVENOUS PRN
Status: DISCONTINUED | OUTPATIENT
Start: 2022-08-12 | End: 2022-08-12 | Stop reason: SDUPTHER

## 2022-08-12 RX ORDER — OXYCODONE HYDROCHLORIDE 5 MG/1
10 TABLET ORAL EVERY 6 HOURS PRN
Status: DISCONTINUED | OUTPATIENT
Start: 2022-08-12 | End: 2022-08-13

## 2022-08-12 RX ORDER — ONDANSETRON 2 MG/ML
4 INJECTION INTRAMUSCULAR; INTRAVENOUS
Status: DISCONTINUED | OUTPATIENT
Start: 2022-08-12 | End: 2022-08-12 | Stop reason: HOSPADM

## 2022-08-12 RX ORDER — MEPERIDINE HYDROCHLORIDE 25 MG/ML
12.5 INJECTION INTRAMUSCULAR; INTRAVENOUS; SUBCUTANEOUS EVERY 5 MIN PRN
Status: DISCONTINUED | OUTPATIENT
Start: 2022-08-12 | End: 2022-08-12 | Stop reason: HOSPADM

## 2022-08-12 RX ORDER — BISACODYL 10 MG
10 SUPPOSITORY, RECTAL RECTAL DAILY PRN
Status: DISCONTINUED | OUTPATIENT
Start: 2022-08-12 | End: 2022-08-13 | Stop reason: HOSPADM

## 2022-08-12 RX ORDER — FENTANYL CITRATE 50 UG/ML
50 INJECTION, SOLUTION INTRAMUSCULAR; INTRAVENOUS EVERY 5 MIN PRN
Status: DISCONTINUED | OUTPATIENT
Start: 2022-08-12 | End: 2022-08-12 | Stop reason: HOSPADM

## 2022-08-12 RX ORDER — SODIUM CHLORIDE 0.9 % (FLUSH) 0.9 %
5-40 SYRINGE (ML) INJECTION PRN
Status: DISCONTINUED | OUTPATIENT
Start: 2022-08-12 | End: 2022-08-12 | Stop reason: HOSPADM

## 2022-08-12 RX ORDER — SODIUM CHLORIDE 0.9 % (FLUSH) 0.9 %
5-40 SYRINGE (ML) INJECTION EVERY 12 HOURS SCHEDULED
Status: DISCONTINUED | OUTPATIENT
Start: 2022-08-12 | End: 2022-08-13 | Stop reason: HOSPADM

## 2022-08-12 RX ORDER — SODIUM CHLORIDE 9 MG/ML
INJECTION, SOLUTION INTRAVENOUS CONTINUOUS PRN
Status: DISCONTINUED | OUTPATIENT
Start: 2022-08-12 | End: 2022-08-12 | Stop reason: SDUPTHER

## 2022-08-12 RX ORDER — PROPOFOL 10 MG/ML
INJECTION, EMULSION INTRAVENOUS CONTINUOUS PRN
Status: DISCONTINUED | OUTPATIENT
Start: 2022-08-12 | End: 2022-08-12 | Stop reason: SDUPTHER

## 2022-08-12 RX ORDER — MIDAZOLAM HYDROCHLORIDE 1 MG/ML
INJECTION INTRAMUSCULAR; INTRAVENOUS PRN
Status: DISCONTINUED | OUTPATIENT
Start: 2022-08-12 | End: 2022-08-12 | Stop reason: SDUPTHER

## 2022-08-12 RX ORDER — MORPHINE SULFATE 2 MG/ML
4 INJECTION, SOLUTION INTRAMUSCULAR; INTRAVENOUS
Status: DISCONTINUED | OUTPATIENT
Start: 2022-08-12 | End: 2022-08-13 | Stop reason: HOSPADM

## 2022-08-12 RX ORDER — SODIUM CHLORIDE 0.9 % (FLUSH) 0.9 %
5-40 SYRINGE (ML) INJECTION EVERY 12 HOURS SCHEDULED
Status: DISCONTINUED | OUTPATIENT
Start: 2022-08-12 | End: 2022-08-12 | Stop reason: HOSPADM

## 2022-08-12 RX ORDER — ONDANSETRON 2 MG/ML
4 INJECTION INTRAMUSCULAR; INTRAVENOUS EVERY 6 HOURS PRN
Status: DISCONTINUED | OUTPATIENT
Start: 2022-08-12 | End: 2022-08-13 | Stop reason: HOSPADM

## 2022-08-12 RX ORDER — POLYETHYLENE GLYCOL 3350 17 G/17G
17 POWDER, FOR SOLUTION ORAL DAILY
Status: DISCONTINUED | OUTPATIENT
Start: 2022-08-12 | End: 2022-08-13 | Stop reason: HOSPADM

## 2022-08-12 RX ORDER — SODIUM CHLORIDE 9 MG/ML
INJECTION, SOLUTION INTRAVENOUS CONTINUOUS
Status: DISCONTINUED | OUTPATIENT
Start: 2022-08-12 | End: 2022-08-12 | Stop reason: HOSPADM

## 2022-08-12 RX ORDER — SODIUM PHOSPHATE, DIBASIC AND SODIUM PHOSPHATE, MONOBASIC 7; 19 G/133ML; G/133ML
1 ENEMA RECTAL DAILY PRN
Status: DISCONTINUED | OUTPATIENT
Start: 2022-08-12 | End: 2022-08-13 | Stop reason: HOSPADM

## 2022-08-12 RX ORDER — CYCLOBENZAPRINE HCL 10 MG
10 TABLET ORAL 3 TIMES DAILY PRN
Status: DISCONTINUED | OUTPATIENT
Start: 2022-08-12 | End: 2022-08-13 | Stop reason: HOSPADM

## 2022-08-12 RX ORDER — GLYCOPYRROLATE 1 MG/5 ML
SYRINGE (ML) INTRAVENOUS PRN
Status: DISCONTINUED | OUTPATIENT
Start: 2022-08-12 | End: 2022-08-12 | Stop reason: SDUPTHER

## 2022-08-12 RX ORDER — DEXAMETHASONE SODIUM PHOSPHATE 4 MG/ML
INJECTION, SOLUTION INTRA-ARTICULAR; INTRALESIONAL; INTRAMUSCULAR; INTRAVENOUS; SOFT TISSUE PRN
Status: DISCONTINUED | OUTPATIENT
Start: 2022-08-12 | End: 2022-08-12 | Stop reason: SDUPTHER

## 2022-08-12 RX ORDER — SENNA AND DOCUSATE SODIUM 50; 8.6 MG/1; MG/1
1 TABLET, FILM COATED ORAL 2 TIMES DAILY
Status: DISCONTINUED | OUTPATIENT
Start: 2022-08-12 | End: 2022-08-13 | Stop reason: HOSPADM

## 2022-08-12 RX ORDER — SODIUM CHLORIDE 9 MG/ML
INJECTION, SOLUTION INTRAVENOUS CONTINUOUS
Status: DISCONTINUED | OUTPATIENT
Start: 2022-08-12 | End: 2022-08-13 | Stop reason: HOSPADM

## 2022-08-12 RX ORDER — SODIUM CHLORIDE 0.9 % (FLUSH) 0.9 %
5-40 SYRINGE (ML) INJECTION PRN
Status: DISCONTINUED | OUTPATIENT
Start: 2022-08-12 | End: 2022-08-13 | Stop reason: HOSPADM

## 2022-08-12 RX ORDER — ACETAMINOPHEN 325 MG/1
650 TABLET ORAL EVERY 6 HOURS PRN
Status: DISCONTINUED | OUTPATIENT
Start: 2022-08-12 | End: 2022-08-13 | Stop reason: HOSPADM

## 2022-08-12 RX ORDER — SODIUM CHLORIDE 9 MG/ML
INJECTION, SOLUTION INTRAVENOUS PRN
Status: DISCONTINUED | OUTPATIENT
Start: 2022-08-12 | End: 2022-08-13 | Stop reason: HOSPADM

## 2022-08-12 RX ORDER — LIDOCAINE HYDROCHLORIDE 10 MG/ML
INJECTION, SOLUTION INFILTRATION; PERINEURAL PRN
Status: DISCONTINUED | OUTPATIENT
Start: 2022-08-12 | End: 2022-08-12 | Stop reason: SDUPTHER

## 2022-08-12 RX ORDER — FENTANYL CITRATE 50 UG/ML
INJECTION, SOLUTION INTRAMUSCULAR; INTRAVENOUS PRN
Status: DISCONTINUED | OUTPATIENT
Start: 2022-08-12 | End: 2022-08-12 | Stop reason: SDUPTHER

## 2022-08-12 RX ORDER — MORPHINE SULFATE 2 MG/ML
2 INJECTION, SOLUTION INTRAMUSCULAR; INTRAVENOUS
Status: DISCONTINUED | OUTPATIENT
Start: 2022-08-12 | End: 2022-08-13 | Stop reason: HOSPADM

## 2022-08-12 RX ORDER — METOPROLOL TARTRATE 5 MG/5ML
INJECTION INTRAVENOUS PRN
Status: DISCONTINUED | OUTPATIENT
Start: 2022-08-12 | End: 2022-08-12 | Stop reason: SDUPTHER

## 2022-08-12 RX ORDER — PROPOFOL 10 MG/ML
INJECTION, EMULSION INTRAVENOUS PRN
Status: DISCONTINUED | OUTPATIENT
Start: 2022-08-12 | End: 2022-08-12 | Stop reason: SDUPTHER

## 2022-08-12 RX ORDER — OXYCODONE HYDROCHLORIDE 5 MG/1
5 TABLET ORAL EVERY 6 HOURS PRN
Status: DISCONTINUED | OUTPATIENT
Start: 2022-08-12 | End: 2022-08-13

## 2022-08-12 RX ORDER — HYDROMORPHONE HCL 110MG/55ML
PATIENT CONTROLLED ANALGESIA SYRINGE INTRAVENOUS PRN
Status: DISCONTINUED | OUTPATIENT
Start: 2022-08-12 | End: 2022-08-12 | Stop reason: SDUPTHER

## 2022-08-12 RX ORDER — ONDANSETRON 4 MG/1
4 TABLET, ORALLY DISINTEGRATING ORAL EVERY 8 HOURS PRN
Status: DISCONTINUED | OUTPATIENT
Start: 2022-08-12 | End: 2022-08-13 | Stop reason: HOSPADM

## 2022-08-12 RX ADMIN — METOPROLOL TARTRATE 2 MG: 1 INJECTION, SOLUTION INTRAVENOUS at 08:23

## 2022-08-12 RX ADMIN — ROCURONIUM BROMIDE 40 MG: 10 INJECTION INTRAVENOUS at 07:41

## 2022-08-12 RX ADMIN — SODIUM CHLORIDE: 9 INJECTION, SOLUTION INTRAVENOUS at 06:26

## 2022-08-12 RX ADMIN — BISACODYL 5 MG: 5 TABLET, COATED ORAL at 17:37

## 2022-08-12 RX ADMIN — SODIUM CHLORIDE: 9 INJECTION, SOLUTION INTRAVENOUS at 23:32

## 2022-08-12 RX ADMIN — LIDOCAINE HYDROCHLORIDE 100 MG: 20 INJECTION, SOLUTION INFILTRATION; PERINEURAL at 07:34

## 2022-08-12 RX ADMIN — SODIUM CHLORIDE: 9 INJECTION, SOLUTION INTRAVENOUS at 06:48

## 2022-08-12 RX ADMIN — MORPHINE SULFATE 2 MG: 2 INJECTION, SOLUTION INTRAMUSCULAR; INTRAVENOUS at 10:54

## 2022-08-12 RX ADMIN — SUGAMMADEX 200 MG: 100 INJECTION, SOLUTION INTRAVENOUS at 08:42

## 2022-08-12 RX ADMIN — DOCUSATE SODIUM 50 MG AND SENNOSIDES 8.6 MG 1 TABLET: 8.6; 5 TABLET, FILM COATED ORAL at 20:19

## 2022-08-12 RX ADMIN — PROPOFOL 120 MCG/KG/MIN: 10 INJECTION, EMULSION INTRAVENOUS at 07:43

## 2022-08-12 RX ADMIN — OXYCODONE 5 MG: 5 TABLET ORAL at 23:28

## 2022-08-12 RX ADMIN — CEFAZOLIN 2000 MG: 10 INJECTION, POWDER, FOR SOLUTION INTRAVENOUS at 15:16

## 2022-08-12 RX ADMIN — FENTANYL CITRATE 50 MCG: 50 INJECTION, SOLUTION INTRAMUSCULAR; INTRAVENOUS at 07:55

## 2022-08-12 RX ADMIN — CEFAZOLIN 2000 MG: 10 INJECTION, POWDER, FOR SOLUTION INTRAVENOUS at 07:39

## 2022-08-12 RX ADMIN — SUGAMMADEX 100 MG: 100 INJECTION, SOLUTION INTRAVENOUS at 09:17

## 2022-08-12 RX ADMIN — FENTANYL CITRATE 25 MCG: 50 INJECTION, SOLUTION INTRAMUSCULAR; INTRAVENOUS at 09:04

## 2022-08-12 RX ADMIN — MIDAZOLAM 2 MG: 1 INJECTION INTRAMUSCULAR; INTRAVENOUS at 07:25

## 2022-08-12 RX ADMIN — MORPHINE SULFATE 2 MG: 2 INJECTION, SOLUTION INTRAMUSCULAR; INTRAVENOUS at 21:50

## 2022-08-12 RX ADMIN — Medication 0.2 MG: at 07:39

## 2022-08-12 RX ADMIN — POLYETHYLENE GLYCOL 3350 17 G: 17 POWDER, FOR SOLUTION ORAL at 17:37

## 2022-08-12 RX ADMIN — CEFAZOLIN 2000 MG: 10 INJECTION, POWDER, FOR SOLUTION INTRAVENOUS at 23:37

## 2022-08-12 RX ADMIN — DEXAMETHASONE SODIUM PHOSPHATE 10 MG: 4 INJECTION, SOLUTION INTRAMUSCULAR; INTRAVENOUS at 07:40

## 2022-08-12 RX ADMIN — PROPOFOL 160 MCG/KG/MIN: 10 INJECTION, EMULSION INTRAVENOUS at 07:59

## 2022-08-12 RX ADMIN — OXYCODONE 5 MG: 5 TABLET ORAL at 17:04

## 2022-08-12 RX ADMIN — HYDROMORPHONE HYDROCHLORIDE 0.5 MG: 2 INJECTION INTRAMUSCULAR; INTRAVENOUS; SUBCUTANEOUS at 08:40

## 2022-08-12 RX ADMIN — LIDOCAINE HYDROCHLORIDE 50 MG: 10 INJECTION, SOLUTION INFILTRATION; PERINEURAL at 08:47

## 2022-08-12 RX ADMIN — FENTANYL CITRATE 50 MCG: 50 INJECTION, SOLUTION INTRAMUSCULAR; INTRAVENOUS at 08:19

## 2022-08-12 RX ADMIN — ONDANSETRON 4 MG: 4 TABLET, ORALLY DISINTEGRATING ORAL at 20:19

## 2022-08-12 RX ADMIN — ROCURONIUM BROMIDE 10 MG: 10 INJECTION INTRAVENOUS at 07:34

## 2022-08-12 RX ADMIN — SODIUM CHLORIDE: 9 INJECTION, SOLUTION INTRAVENOUS at 15:15

## 2022-08-12 RX ADMIN — FENTANYL CITRATE 50 MCG: 50 INJECTION, SOLUTION INTRAMUSCULAR; INTRAVENOUS at 07:34

## 2022-08-12 RX ADMIN — ONDANSETRON 4 MG: 2 INJECTION INTRAMUSCULAR; INTRAVENOUS at 08:03

## 2022-08-12 RX ADMIN — PROPOFOL 180 MG: 10 INJECTION, EMULSION INTRAVENOUS at 07:34

## 2022-08-12 RX ADMIN — OXYCODONE 10 MG: 5 TABLET ORAL at 13:48

## 2022-08-12 RX ADMIN — FENTANYL CITRATE 50 MCG: 50 INJECTION, SOLUTION INTRAMUSCULAR; INTRAVENOUS at 08:02

## 2022-08-12 ASSESSMENT — PAIN DESCRIPTION - DESCRIPTORS
DESCRIPTORS: ACHING;HEAVINESS
DESCRIPTORS: ACHING
DESCRIPTORS: ACHING;HEAVINESS

## 2022-08-12 ASSESSMENT — PAIN SCALES - GENERAL
PAINLEVEL_OUTOF10: 2
PAINLEVEL_OUTOF10: 2
PAINLEVEL_OUTOF10: 6
PAINLEVEL_OUTOF10: 3
PAINLEVEL_OUTOF10: 7
PAINLEVEL_OUTOF10: 7
PAINLEVEL_OUTOF10: 10
PAINLEVEL_OUTOF10: 7
PAINLEVEL_OUTOF10: 5
PAINLEVEL_OUTOF10: 0
PAINLEVEL_OUTOF10: 4
PAINLEVEL_OUTOF10: 6
PAINLEVEL_OUTOF10: 6

## 2022-08-12 ASSESSMENT — PAIN DESCRIPTION - LOCATION
LOCATION: NECK

## 2022-08-12 ASSESSMENT — PAIN - FUNCTIONAL ASSESSMENT
PAIN_FUNCTIONAL_ASSESSMENT: ACTIVITIES ARE NOT PREVENTED
PAIN_FUNCTIONAL_ASSESSMENT: 0-10

## 2022-08-12 ASSESSMENT — PAIN DESCRIPTION - ORIENTATION
ORIENTATION: ANTERIOR
ORIENTATION: LOWER

## 2022-08-12 NOTE — PROGRESS NOTES
Pt returned to [facility] room 16. Vitals and assessment as charted. 0.9 infusing, @700ml to count from PACU. Pt has ice chips and water. Family at the bedside. Pt and family verbalized understanding of discharge criteria and call light use. Call light in reach.

## 2022-08-12 NOTE — ANESTHESIA POSTPROCEDURE EVALUATION
Department of Anesthesiology  Postprocedure Note    Patient: Gloria Holm  MRN: [id number]  YOB: 1980  Date of evaluation: 8/12/2022      Procedure Summary     Date: 08/12/22 Room / Location: [address]    Anesthesia Start: 0725 Anesthesia Stop: 3398    Procedure: C5-6 TOTAL DISC REPLACEMENT (Neck) Diagnosis:       Cervical spinal stenosis      (Cervical spinal stenosis [M48.02])    Surgeons: Araseli Baird MD Responsible Provider: Patti Swartz DO    Anesthesia Type: general ASA Status: 2          Anesthesia Type: No value filed.     Agustin Phase I: Agustin Score: 9    Agustin Phase II: Agustin Score: 9      Anesthesia Post Evaluation    Patient location during evaluation: PACU  Patient participation: complete - patient participated  Level of consciousness: awake and alert  Pain score: 2  Airway patency: patent  Nausea & Vomiting: no nausea and no vomiting  Complications: no  Cardiovascular status: hemodynamically stable and blood pressure returned to baseline  Respiratory status: spontaneous ventilation, acceptable and nasal cannula  Hydration status: stable

## 2022-08-12 NOTE — ANESTHESIA PRE PROCEDURE
Department of Anesthesiology  Preprocedure Note       Name:  Leslie Tinoco   Age:  38 y.o.  :  1980                                          MRN:  [id number]         Date:  2022      Surgeon: Felice Pringle):  Diane Lama MD    Procedure: Procedure(s):  C5-6 TOTAL DISC REPLACEMENT    Medications prior to admission:   Prior to Admission medications    Medication Sig Start Date End Date Taking? Authorizing Provider   acetaminophen (TYLENOL) 325 MG tablet Take 650 mg by mouth every 6 hours as needed for Pain    Historical Provider, MD   ibuprofen (ADVIL;MOTRIN) 800 MG tablet Take 800 mg by mouth every 6 hours as needed for Pain    Historical Provider, MD   Multiple Vitamins-Minerals (THERAPEUTIC MULTIVITAMIN-MINERALS) tablet Take 1 tablet by mouth in the morning. Historical Provider, MD       Current medications:    Current Facility-Administered Medications   Medication Dose Route Frequency Provider Last Rate Last Admin    0.9 % sodium chloride infusion   IntraVENous Continuous ELIOT Olvera 125 mL/hr at 22 0626 New Bag at 22 0626    sodium chloride flush 0.9 % injection 5-40 mL  5-40 mL IntraVENous 2 times per day ELIOT Olvera        sodium chloride flush 0.9 % injection 5-40 mL  5-40 mL IntraVENous PRN ELIOT Olvera        0.9 % sodium chloride infusion   IntraVENous PRN ELIOT Olvera        ceFAZolin (ANCEF) 2000 mg in dextrose 5 % 50 mL IVPB  2,000 mg IntraVENous On Call to [address] PA         Facility-Administered Medications Ordered in Other Encounters   Medication Dose Route Frequency Provider Last Rate Last Admin    0.9 % sodium chloride infusion   IntraVENous Continuous PRN JOHN Dickinson - CRNA   New Bag at 22 4633       Allergies:  No Known Allergies    Problem List:    Patient Active Problem List   Diagnosis Code    Priapism, drug-induced N48.33    Cervical stenosis of spine M48.02       Past Medical History:  History reviewed.  No pertinent past medical history. Past Surgical History:  History reviewed. No pertinent surgical history. Social History:    Social History     Tobacco Use    Smoking status: Former     Types: Cigarettes     Quit date:      Years since quittin.6    Smokeless tobacco: Never   Substance Use Topics    Alcohol use: Yes     Comment: seldom- rarely                                Counseling given: Not Answered      Vital Signs (Current):   Vitals:    22 0555 22 0605   BP: 122/75    Pulse: 73    Resp: 16    Temp: 97.8 °F (36.6 °C)    SpO2: 95%    Weight:  241 lb (109.3 kg)   Height:  5' 8\" (1.727 m)                                              BP Readings from Last 3 Encounters:   22 122/75   21 129/74   21 126/76       NPO Status: Time of last liquid consumption:                         Time of last solid consumption:                         Date of last liquid consumption: 22                        Date of last solid food consumption: 22    BMI:   Wt Readings from Last 3 Encounters:   22 241 lb (109.3 kg)   21 215 lb (97.5 kg)   21 215 lb (97.5 kg)     Body mass index is 36.64 kg/m². CBC:   Lab Results   Component Value Date/Time    WBC 5.9 2022 12:30 PM    RBC 4.81 2022 12:30 PM    HGB 15.3 2022 12:30 PM    HCT 45.8 2022 12:30 PM    MCV 95.2 2022 12:30 PM     2022 12:30 PM       CMP:   Lab Results   Component Value Date/Time     2022 12:30 PM    K 4.0 2022 12:30 PM     2022 12:30 PM    CO2 26 2022 12:30 PM    BUN 8 2022 12:30 PM    CREATININE 0.9 2022 12:30 PM    LABGLOM >90 2022 12:30 PM    GLUCOSE 98 2022 12:30 PM    CALCIUM 9.3 2022 12:30 PM       POC Tests: No results for input(s): POCGLU, POCNA, POCK, POCCL, POCBUN, POCHEMO, POCHCT in the last 72 hours.     Coags:   Lab Results   Component Value Date/Time    INR 1.09 07/25/2022 12:30 PM    APTT 36.2 07/25/2022 12:30 PM       HCG (If Applicable): No results found for: PREGTESTUR, PREGSERUM, HCG, HCGQUANT     ABGs: No results found for: PHART, PO2ART, DUD3EGF, DWU8IBQ, BEART, Z2DAGGNS     Type & Screen (If Applicable):  No results found for: LABABO, LABRH    Drug/Infectious Status (If Applicable):  No results found for: HIV, HEPCAB    COVID-19 Screening (If Applicable):   Lab Results   Component Value Date/Time    COVID19 NOT DETECTED 09/09/2021 10:57 PM           Anesthesia Evaluation  Patient summary reviewed and Nursing notes reviewed no history of anesthetic complications:   Airway: Mallampati: III  TM distance: >3 FB   Neck ROM: full  Mouth opening: > = 3 FB   Dental:          Pulmonary:Negative Pulmonary ROS and normal exam  breath sounds clear to auscultation                             Cardiovascular:Negative CV ROS  Exercise tolerance: good (>4 METS),                     Neuro/Psych:   Negative Neuro/Psych ROS              GI/Hepatic/Renal:            ROS comment: obesity. Endo/Other: Negative Endo/Other ROS             Pt had no PAT visit       Abdominal:   (+) obese,     Abdomen: soft. Vascular: negative vascular ROS. Other Findings:           Anesthesia Plan      general     ASA 2       Induction: intravenous. MIPS: Postoperative opioids intended and Prophylactic antiemetics administered. Anesthetic plan and risks discussed with patient and spouse. Plan discussed with CRNA.                     Allen Loza,    8/12/2022

## 2022-08-12 NOTE — PROGRESS NOTES
1634 pt arrived to PACU. OPA in place. Awakens to voice, OPA removed. VSS. Denies numbness tingling or pain. 7668 pt resting, resp easy  0949 pt resting, awakens to voice and states pain 3/10 and tolerable. VSS  1000 pt awake in bed, states pain 3/10 and tolerable. Denies need for pain medication at this time.  Meets criteria for discharge from PACU, transported to [facility]

## 2022-08-12 NOTE — OP NOTE
Operative Note      Patient: Miri Wan  YOB: 1980  MRN: [id number]                       Account #: [de-identified]                               Room #: 011  Admission Date: 8/12/2022    Provider:  Nga Reddy M.D.     DATE OF PROCEDURE: 8/12/2022     PREOPERATIVE DIAGNOSES:  1. C5-C6 cervical stenosis with radiculopathy. 2.  C5-C6 degenerative disc disease. 3.  C5-C6 herniated nucleus pulposus  4. Obesity, BMI of 36.64     POSTOPERATIVE DIAGNOSES:  1. C5-C6 cervical stenosis with radiculopathy. 2.  C5-C6 degenerative disc disease. 3.  C5-C6 herniated nucleus pulposus  4. Obesity, BMI of 36.64     OPERATION PERFORMED:  1. C5-C6 anterior cervical diskectomy with decompression and stabilization of spinal cord and nerve roots. 2.  C5-C6 total disc replacement, M6-C 6LL, Orthofix instrumentation    SURGEON:  Manuela Henriquez M.D.     ASSISTANT:  Bree Liang PA-C. Bree Liang PA-C, assisted throughout the procedure with positioning, draping, retraction, wound closure, and dressing application     ANESTHESIA:  General.     INDICATIONS:  This is a 38 y.o. male with refractory neck and right arm pain and left hand numbness/weakness from cervical stenosis primarily at C5 through C6 that began following a work accident in 2021. Patient had tried and failed conservative therapy including medication management, physical therapy, and chiropractic treatment. Due to the persistence of symptoms and reduction in ADLs, patient elected surgical treatment. Patient therefore understood the indications for the surgery as well as risks, benefits, and alternatives. These risks included, but are not limited to, paralysis, infection, dural tear, hematoma, nerve root injury, hardware failure, permanent speech and swallowing disturbances, DVT/PE, stroke, MI, death etc. All questions were answered and informed consent was obtained.      OPERATIVE PROCEDURE:  The patient was taken to the operating room by Anesthesiology Service and had satisfactory general anesthesia. A first-generation cephalosporin was given within 1 hour of surgical incision, 2 g of cefazolin was given IV. Venous thromboembolic prophylaxis was performed with sequential devices. The patient was then positioned supine on a standard OR table, occiput in a doughnut. Neck extended well within the means of what can be tolerated neurologically. The anterior neck was then prepped and draped entirely in the usual sterile fashion. Before incision, a formal time-out was taken per protocol. We next took a left-sided Loyola-Dhillon approach to the anterior cervical spine. A vertical incision was made in line with the skin crease. The platysma was divided in line with this incision. Blunt dissection was then proceeded medial to the sternocleidomastoid and carotid sheath. The omohyoid was divided. Intraoperative radiographic localization of level was confirmed. We then elevated the longus colli from C5 through C6. Satisfied with the exposure and confirmation of level, we place the self-retaining retractor at C5-C6. We then began complete diskectomy with a variety of curettes from uncus to uncus. Bilateral endplate decortications were then performed with a high-speed carlos going back to the PLL. The PLL was then resected, incised in the midline going to neural foramina bilaterally. This was done until we could see the exiting portion of the C6 nerve roots. This thereby totally decompressed the neural elements. Satisfied with this, we then achieved hemostasis and sized the interspace. A size 6-mm M6-C total disc cage appeared appropriate. The trial was then removed and the endplates were then prepared in standard technique with keel punches. The total disc was then assembled on the back table and impacted into position with excellent tight fit as confirmed on fluoroscopy AP and lateral views.       Satisfied with this, we then achieved hemostasis. We then copiously irrigated the wound. We then inserted a ZEINAB drain through a separate stab incision. The wound was then closed in layers with a single running 2-0 Vicryl suture. A 4-0 Monocryl was used for the skin. The skin edges were sealed with Dermabond. Steri-strips were placed over the incision. A dry sterile dressing was applied. Cervical collar secured into place. The patient was then returned to the hospital bed, extubated, and taken to the recovery room in stable condition. Spinal cord monitoring remained stable throughout the operation. Implants:  Implant Name Type Inv. Item Serial No.  Lot No. LRB No. Used Action   REF [id number]   ORTHOFIX     6MM    [id number]   N/A 1 Implanted         Drains:   Closed/Suction Drain Left; Anterior Neck Bulb (Active)   Site Description Clean, dry & intact 08/12/22 1000   Dressing Status Clean, dry & intact 08/12/22 1000   Drainage Appearance Bloody 08/12/22 1000   Drain Status To bulb suction 40/36/19 0527       COMPLICATIONS:  None. SPECIMENS:  None. ESTIMATED BLOOD LOSS:  15 mL. POSTOPERATIVE CARE:  The patient will be recovered in PACU and then a regular nursing floor. Once the drainage is low and pain is under control, patient will be discharged home per clinical indication. Patient will follow up in the office in six weeks. At that time, AP and lateral x-rays of the cervical spine will be obtained to assess instrumentation. MODIFIER 22:  Due to the patient's BMI over 30, modifier 22 applied due to the patient's case taking 50% longer due to poor visualization and orientation.     Electronically signed by Quique Levin MD on 8/12/2022 at 10:22 AM

## 2022-08-12 NOTE — BRIEF OP NOTE
Brief Postoperative Note      Patient: Bong Romero  YOB: 1980  MRN: [id number]    Date of Procedure: 8/12/2022    Pre-Op Diagnosis: C5-6 degenerative disc disease with stenosis and radiculopathy    Post-Op Diagnosis: Same       Procedure(s):  C5-6 TOTAL DISC REPLACEMENT    Surgeon(s):  Alfred Anaya MD    Assistant:  Physician Assistant: Gregary Runner. KENDALL Leonard    Anesthesia: General    Estimated Blood Loss (mL): 15 mL    Complications: None    Specimens:   * No specimens in log *    Implants:  Implant Name Type Inv. Item Serial No.  Lot No. LRB No. Used Action   REF [id number]   ORTHOFIX     6MM    [id number]   N/A 1 Implanted         Drains:   Closed/Suction Drain Left; Anterior Neck Bulb (Active)       Findings: Stenosis    Electronically signed by ELIOT Gallego on 8/12/2022 at 8:40 AM

## 2022-08-13 VITALS
HEART RATE: 86 BPM | SYSTOLIC BLOOD PRESSURE: 118 MMHG | BODY MASS INDEX: 36.53 KG/M2 | DIASTOLIC BLOOD PRESSURE: 78 MMHG | OXYGEN SATURATION: 98 % | RESPIRATION RATE: 16 BRPM | WEIGHT: 241 LBS | HEIGHT: 68 IN | TEMPERATURE: 98 F

## 2022-08-13 PROCEDURE — 2580000003 HC RX 258: Performed by: PHYSICIAN ASSISTANT

## 2022-08-13 PROCEDURE — 6360000002 HC RX W HCPCS: Performed by: PHYSICIAN ASSISTANT

## 2022-08-13 PROCEDURE — 6370000000 HC RX 637 (ALT 250 FOR IP): Performed by: PHYSICIAN ASSISTANT

## 2022-08-13 PROCEDURE — G0378 HOSPITAL OBSERVATION PER HR: HCPCS

## 2022-08-13 RX ORDER — OXYCODONE HYDROCHLORIDE 5 MG/1
5 TABLET ORAL EVERY 4 HOURS PRN
Status: DISCONTINUED | OUTPATIENT
Start: 2022-08-13 | End: 2022-08-13 | Stop reason: HOSPADM

## 2022-08-13 RX ORDER — OXYCODONE HYDROCHLORIDE 5 MG/1
10 TABLET ORAL EVERY 4 HOURS PRN
Status: DISCONTINUED | OUTPATIENT
Start: 2022-08-13 | End: 2022-08-13 | Stop reason: HOSPADM

## 2022-08-13 RX ADMIN — DOCUSATE SODIUM 50 MG AND SENNOSIDES 8.6 MG 1 TABLET: 8.6; 5 TABLET, FILM COATED ORAL at 09:37

## 2022-08-13 RX ADMIN — BISACODYL 5 MG: 5 TABLET, COATED ORAL at 09:39

## 2022-08-13 RX ADMIN — OXYCODONE 5 MG: 5 TABLET ORAL at 05:27

## 2022-08-13 RX ADMIN — OXYCODONE 5 MG: 5 TABLET ORAL at 11:02

## 2022-08-13 RX ADMIN — CYCLOBENZAPRINE 10 MG: 10 TABLET, FILM COATED ORAL at 05:20

## 2022-08-13 RX ADMIN — CYCLOBENZAPRINE 10 MG: 10 TABLET, FILM COATED ORAL at 09:46

## 2022-08-13 RX ADMIN — MORPHINE SULFATE 2 MG: 2 INJECTION, SOLUTION INTRAMUSCULAR; INTRAVENOUS at 03:36

## 2022-08-13 RX ADMIN — SODIUM CHLORIDE: 9 INJECTION, SOLUTION INTRAVENOUS at 08:51

## 2022-08-13 ASSESSMENT — PAIN DESCRIPTION - ORIENTATION
ORIENTATION: MID

## 2022-08-13 ASSESSMENT — PAIN DESCRIPTION - LOCATION
LOCATION: NECK

## 2022-08-13 ASSESSMENT — PAIN - FUNCTIONAL ASSESSMENT
PAIN_FUNCTIONAL_ASSESSMENT: ACTIVITIES ARE NOT PREVENTED
PAIN_FUNCTIONAL_ASSESSMENT: ACTIVITIES ARE NOT PREVENTED

## 2022-08-13 ASSESSMENT — PAIN DESCRIPTION - DESCRIPTORS
DESCRIPTORS: ACHING;DISCOMFORT
DESCRIPTORS: ACHING
DESCRIPTORS: ACHING

## 2022-08-13 ASSESSMENT — PAIN SCALES - GENERAL
PAINLEVEL_OUTOF10: 9
PAINLEVEL_OUTOF10: 5
PAINLEVEL_OUTOF10: 9
PAINLEVEL_OUTOF10: 8
PAINLEVEL_OUTOF10: 9

## 2022-08-13 NOTE — CARE COORDINATION
8/13/22, 11:11 AM EDT  DISCHARGE PLANNING EVALUATION:    Shanna Velazquez       Admitted: 8/12/2022/ [address] [facility],[address] day: 0   Location: [facility]16/016-A Reason for admit: Cervical spinal stenosis [M48.02]  Cervical stenosis of spine [M48.02]   PMH:  has no past medical history on file. Procedure:   8/12 C5-6 TOTAL DISC REPLACEMENT  Barriers to Discharge:  POD 1. Pain control. Drain care. Per report, having sore throat. PCP: Kamlesh Davis, APRN - CNP   %  Patient's Healthcare Decision Maker: Legal Next of Kin    Patient Goals/Plan/Treatment Preferences: Spoke with Dionne Barroso and s/o, plans to return home. [facility] list offered, prefers [facility] if needed. Transportation/Food Security/Housekeeping Addressed:  No issues identified. 8/13/22, 11:13 AM EDT    Patient goals/plan/ treatment preferences discussed by  and . Patient goals/plan/ treatment preferences reviewed with patient/ family. Patient/ family verbalize understanding of discharge plan and are in agreement with goal/plan/treatment preferences. Understanding was demonstrated using the teach back method. AVS provided by RN at time of discharge, which includes all necessary medical information pertaining to the patients current course of illness, treatment, post-discharge goals of care, and treatment preferences. Drain to be removed. Discharging home.      Services At/After Discharge: None

## 2022-08-13 NOTE — PROGRESS NOTES
ZEINAB drain in neck removed without difficulty. New gauze dressing applied over steri strips. IV in left hand removed and dry gauze dressing applied. Dressing supplied given to the wife to change dressing at home. Instructed on how and when to change dressing with both earnest and his wife's verbalization of understanding.

## 2022-08-13 NOTE — PROGRESS NOTES
Discharge instructions and follow up appointment given to georgie and his wife with read back verbalization of understanding. Discharged per wheelchair to car with wife.

## 2022-09-08 ENCOUNTER — HOSPITAL ENCOUNTER (OUTPATIENT)
Dept: INTERVENTIONAL RADIOLOGY/VASCULAR | Age: 42
Discharge: HOME | End: 2022-09-08
Payer: MEDICAID

## 2022-09-08 VITALS
OXYGEN SATURATION: 94 % | DIASTOLIC BLOOD PRESSURE: 63 MMHG | HEART RATE: 86 BPM | TEMPERATURE: 98.3 F | WEIGHT: 235 LBS | RESPIRATION RATE: 18 BRPM | BODY MASS INDEX: 35.61 KG/M2 | HEIGHT: 68 IN | SYSTOLIC BLOOD PRESSURE: 98 MMHG

## 2022-09-08 PROCEDURE — 2500000003 HC RX 250 WO HCPCS: Performed by: RADIOLOGY

## 2022-09-08 PROCEDURE — 6360000004 HC RX CONTRAST MEDICATION: Performed by: RADIOLOGY

## 2022-09-08 PROCEDURE — 2709999900 IR FLUORO GUIDED LUMBAR PUNCTURE THERAPY

## 2022-09-08 PROCEDURE — 62323 NJX INTERLAMINAR LMBR/SAC: CPT

## 2022-09-08 PROCEDURE — 6360000002 HC RX W HCPCS: Performed by: RADIOLOGY

## 2022-09-08 RX ORDER — DEXAMETHASONE SODIUM PHOSPHATE 4 MG/ML
4 INJECTION, SOLUTION INTRA-ARTICULAR; INTRALESIONAL; INTRAMUSCULAR; INTRAVENOUS; SOFT TISSUE ONCE
Status: COMPLETED | OUTPATIENT
Start: 2022-09-08 | End: 2022-09-08

## 2022-09-08 RX ORDER — BUPIVACAINE HYDROCHLORIDE 2.5 MG/ML
5 INJECTION, SOLUTION EPIDURAL; INFILTRATION; INTRACAUDAL ONCE
Status: COMPLETED | OUTPATIENT
Start: 2022-09-08 | End: 2022-09-08

## 2022-09-08 RX ADMIN — BUPIVACAINE HYDROCHLORIDE 2 ML: 2.5 INJECTION, SOLUTION EPIDURAL; INFILTRATION; INTRACAUDAL; PERINEURAL at 11:39

## 2022-09-08 RX ADMIN — DEXAMETHASONE SODIUM PHOSPHATE 4 MG: 4 INJECTION, SOLUTION INTRAMUSCULAR; INTRAVENOUS at 11:39

## 2022-09-08 RX ADMIN — IOHEXOL 1 ML: 180 INJECTION INTRAVENOUS at 11:39

## 2022-09-08 ASSESSMENT — PAIN DESCRIPTION - PAIN TYPE: TYPE: CHRONIC PAIN

## 2022-09-08 ASSESSMENT — PAIN DESCRIPTION - ORIENTATION: ORIENTATION: LOWER

## 2022-09-08 ASSESSMENT — PAIN DESCRIPTION - LOCATION: LOCATION: BACK

## 2022-09-08 ASSESSMENT — PAIN DESCRIPTION - FREQUENCY: FREQUENCY: CONTINUOUS

## 2022-09-08 ASSESSMENT — PAIN SCALES - GENERAL
PAINLEVEL_OUTOF10: 0
PAINLEVEL_OUTOF10: 6

## 2022-09-08 ASSESSMENT — PAIN DESCRIPTION - DESCRIPTORS: DESCRIPTORS: ACHING

## 2022-09-08 NOTE — PROGRESS NOTES
1125 Patient received in IR for lumbar epidural injection. 1130 This procedure has been fully reviewed with the patient and written informed consent has been obtained. 1136 Procedure started with Dr. Pippa Orellana. 1140 Procedure completed; patient tolerated well. Band aid to lower back; no bleeding noted. 1142 Patient on cart; comfort ensured. 1145 Patient taken to OPN via cart.

## 2022-09-08 NOTE — H&P
Formulation and discussion of sedation / procedure plans, risks, benefits, side effects and alternatives with patient and/or responsible adult completed.     Electronically signed by Tyrone Downey MD on 9/8/2022 at 11:43 AM

## 2022-09-08 NOTE — PROGRESS NOTES
1153: Patient back from procedure, tolerated well. States no pain at this time. States numbness in buttocks, down back of legs, and feet. Pedal push/pull strong and equal.  Vitals as charted. Bandaid clean,dry, intact. 1215: Vitals as charted. Bandaid clean, dry, intact. States numbness in feet are subsiding. 1225: Vitals as charted. Bandaid clean, dry, intact. States no numbness in feet or legs just a little in buttocks. Ambulated with gait steady. AVS reviewed with patient, voiced understanding. Patient discharged ambulatory. Give for 1 month

## 2022-09-08 NOTE — DISCHARGE INSTRUCTIONS
EPIDURAL INJECTION  DISCHARGE INSTRUCTION    1. Take it easy and rest the remainder of the day. 2.  Do not drive for the remainder of the day. 3.  You may use ice on the area of the injection to help alleviate discomfort. Avoid heat for the remainder of the day. 4.  Do not soak in water or use a tub bath or hot tub for the remainder of the day. 5.  You may resume normal eating and drinking. 6. This evening you may resume your pain medications and any other medications you had stopped prior to the injection. 7.  Resume activities starting tomorrow in gradual manner. You may resume physical therapy. 8. Follow up with ordering doctor if you continue to have pain. 9.  If you do have another nerve block ordered, follow instructions below:  Bring someone to drive you home. Nothing to eat or drink 2 hours prior. No blood thinners or aspirin products 5 days prior. 8.  Notify Radiology ([phone]), or go to the emergency room immediately if you develop any of the following symptoms: fever, chills, changes in mental status, severe pain, difficulty breathing, prolonged, severe headache, numbness or weakness in your arms or legs, loss of control of your bladder or bowel, excessive redness, swelling, or drainage from the area of injection.

## 2022-09-08 NOTE — OP NOTE
Department of Radiology  Post Procedure Progress Note    Pre-Procedure Diagnosis:  Lumbar radiculopathy     Procedure Performed:  Epidural block/steroid injection      Anesthesia: local     Findings: successful    Immediate Complications:  None    Estimated Blood Loss: minimal    SEE DICTATED PROCEDURE NOTE FOR COMPLETE DETAILS.       Electronically signed by Jaron Menendez MD on 9/8/2022 at 11:44 AM

## 2022-10-05 ENCOUNTER — OFFICE VISIT (OUTPATIENT)
Dept: UROLOGY | Age: 42
End: 2022-10-05
Payer: MEDICAID

## 2022-10-05 VITALS
HEIGHT: 68 IN | SYSTOLIC BLOOD PRESSURE: 128 MMHG | DIASTOLIC BLOOD PRESSURE: 78 MMHG | WEIGHT: 243 LBS | BODY MASS INDEX: 36.83 KG/M2

## 2022-10-05 DIAGNOSIS — N52.03 COMBINED ARTERIAL INSUFFICIENCY AND CORPORO-VENOUS OCCLUSIVE ERECTILE DYSFUNCTION: Primary | ICD-10-CM

## 2022-10-05 DIAGNOSIS — N48.30 PRIAPISM: ICD-10-CM

## 2022-10-05 PROCEDURE — 99214 OFFICE O/P EST MOD 30 MIN: CPT | Performed by: UROLOGY

## 2022-10-05 PROCEDURE — G8484 FLU IMMUNIZE NO ADMIN: HCPCS | Performed by: UROLOGY

## 2022-10-05 PROCEDURE — G8417 CALC BMI ABV UP PARAM F/U: HCPCS | Performed by: UROLOGY

## 2022-10-05 PROCEDURE — 1036F TOBACCO NON-USER: CPT | Performed by: UROLOGY

## 2022-10-05 PROCEDURE — G8427 DOCREV CUR MEDS BY ELIG CLIN: HCPCS | Performed by: UROLOGY

## 2022-10-05 RX ORDER — TADALAFIL 20 MG/1
20 TABLET ORAL DAILY PRN
Qty: 10 TABLET | Refills: 5 | Status: SHIPPED | OUTPATIENT
Start: 2022-10-05 | End: 2022-11-04

## 2022-10-05 RX ORDER — SILDENAFIL CITRATE 20 MG/1
TABLET ORAL
COMMUNITY
Start: 2022-09-28

## 2022-10-05 NOTE — PROGRESS NOTES
Dr. Arlene Hernandez MD  [facility])  Urology Clinic        Patient:  Bailey Briceno  YOB: 1980  Date: 10/5/2022    HISTORY OF PRESENT ILLNESS:   The patient is a 38 y.o. male who presents today for evaluation of the following problem(s): ED. Intermittent priapism on trimix. Overall the problem(s) : are worsening. Associated Symptoms: No dysuria, gross hematuria. Pain Severity: 0/10    Summary of old records:   Trimix    Imaging/Labs reviewed during today's visit:  I have independently reviewed and verified the following films during today's visit. None    Last several PSA's:  No results found for: PSA    Last total testosterone:  No results found for: TESTOSTERONE    Urinalysis today:  No results found for this visit on 10/05/22. Last BUN and creatinine:  Lab Results   Component Value Date    BUN 8 07/25/2022     Lab Results   Component Value Date    CREATININE 0.9 07/25/2022       Imaging Reviewed during this Office Visit:   (results were independently reviewed by physician and radiology report verified)    PAST MEDICAL, FAMILY AND SOCIAL HISTORY:  No past medical history on file. Past Surgical History:   Procedure Laterality Date    CERVICAL DISC ARTHROPLASTY N/A 8/12/2022    C5-6 TOTAL DISC REPLACEMENT performed by Pao Kan MD at [address] [place] [address] History   Problem Relation Age of Onset    High Blood Pressure Mother     Heart Disease Mother     Stroke Father      Outpatient Medications Marked as Taking for the 10/5/22 encounter (Office Visit) with Arlene Hernandez MD   Medication Sig Dispense Refill    sildenafil (REVATIO) 20 MG tablet 2-5 tablets      acetaminophen (TYLENOL) 325 MG tablet Take 650 mg by mouth every 6 hours as needed for Pain      ibuprofen (ADVIL;MOTRIN) 800 MG tablet Take 800 mg by mouth every 6 hours as needed for Pain      Multiple Vitamins-Minerals (THERAPEUTIC MULTIVITAMIN-MINERALS) tablet Take 1 tablet by mouth in the morning.          Patient has no known allergies. Social History     Tobacco Use   Smoking Status Former    Types: Cigarettes    Quit date:     Years since quittin.7   Smokeless Tobacco Never       Social History     Substance and Sexual Activity   Alcohol Use Yes    Comment: seldom- rarely       REVIEW OF SYSTEMS:  Constitutional: negative  Eyes: negative  Respiratory: negative  Cardiovascular: negative  Gastrointestinal: negative  Musculoskeletal: negative  Genitourinary: negative except for what is in HPI  Skin: negative   Neurological: negative  Hematological/Lymphatic: negative  Psychological: negative    Physical Exam:    This a 38 y.o. male   Vitals:    10/05/22 0758   BP: 128/78     Constitutional: Patient in no acute distress; Neuro: alert and oriented to person place and time. Psych: Mood and affect normal.  Skin: Normal  Lungs: Respiratory effort normal  Cardiovascular:  Normal peripheral pulses  Abdomen: Soft, non-tender, non-distended with no CVA, flank pain, hepatosplenomegaly or hernia. Kidneys normal.  Bladder non-tender and not distended. Lymphatics: no palpable lymphadenopathy      Assessment and Plan      1. Combined arterial insufficiency and corporo-venous occlusive erectile dysfunction           Plan:      No follow-ups on file. Tadalafil 10mg daily, then additional 10mg on demand.           Dr. Kirill Mathews MD

## 2022-11-30 ENCOUNTER — OFFICE VISIT (OUTPATIENT)
Dept: UROLOGY | Age: 42
End: 2022-11-30
Payer: MEDICAID

## 2022-11-30 VITALS — WEIGHT: 248 LBS | BODY MASS INDEX: 37.59 KG/M2 | HEIGHT: 68 IN | RESPIRATION RATE: 18 BRPM

## 2022-11-30 DIAGNOSIS — N48.30 PRIAPISM: ICD-10-CM

## 2022-11-30 DIAGNOSIS — N52.03 COMBINED ARTERIAL INSUFFICIENCY AND CORPORO-VENOUS OCCLUSIVE ERECTILE DYSFUNCTION: Primary | ICD-10-CM

## 2022-11-30 PROCEDURE — G8427 DOCREV CUR MEDS BY ELIG CLIN: HCPCS | Performed by: UROLOGY

## 2022-11-30 PROCEDURE — 99214 OFFICE O/P EST MOD 30 MIN: CPT | Performed by: UROLOGY

## 2022-11-30 PROCEDURE — G8417 CALC BMI ABV UP PARAM F/U: HCPCS | Performed by: UROLOGY

## 2022-11-30 PROCEDURE — 1036F TOBACCO NON-USER: CPT | Performed by: UROLOGY

## 2022-11-30 PROCEDURE — G8484 FLU IMMUNIZE NO ADMIN: HCPCS | Performed by: UROLOGY

## 2022-11-30 NOTE — PROGRESS NOTES
Dr. Peg Mcconnell MD  [facility])  Urology Clinic        Patient:  Shereen Hill  YOB: 1980  Date: 11/30/2022    HISTORY OF PRESENT ILLNESS:   The patient is a 38 y.o. male who presents today for evaluation of the following problem(s): ED. Intermittent priapism on trimix. Overall the problem(s) : are worsening. Associated Symptoms: No dysuria, gross hematuria. Pain Severity: 0/10    Summary of old records:   Trimix    Imaging/Labs reviewed during today's visit:  I have independently reviewed and verified the following films during today's visit. None    Last several PSA's:  No results found for: PSA    Last total testosterone:  No results found for: TESTOSTERONE    Urinalysis today:  No results found for this visit on 11/30/22. Last BUN and creatinine:  Lab Results   Component Value Date    BUN 8 07/25/2022     Lab Results   Component Value Date    CREATININE 0.9 07/25/2022       Imaging Reviewed during this Office Visit:   (results were independently reviewed by physician and radiology report verified)    PAST MEDICAL, FAMILY AND SOCIAL HISTORY:  No past medical history on file. Past Surgical History:   Procedure Laterality Date    CERVICAL DISC ARTHROPLASTY N/A 8/12/2022    C5-6 TOTAL DISC REPLACEMENT performed by Tanner Tucker MD at [address] [place] [address] History   Problem Relation Age of Onset    High Blood Pressure Mother     Heart Disease Mother     Stroke Father      Outpatient Medications Marked as Taking for the 11/30/22 encounter (Office Visit) with Peg Mcconnell MD   Medication Sig Dispense Refill    sildenafil (REVATIO) 20 MG tablet 2-5 tablets      acetaminophen (TYLENOL) 325 MG tablet Take 650 mg by mouth every 6 hours as needed for Pain      ibuprofen (ADVIL;MOTRIN) 800 MG tablet Take 800 mg by mouth every 6 hours as needed for Pain      Multiple Vitamins-Minerals (THERAPEUTIC MULTIVITAMIN-MINERALS) tablet Take 1 tablet by mouth in the morning.          Patient has no known allergies. Social History     Tobacco Use   Smoking Status Former    Types: Cigarettes    Quit date:     Years since quittin.9   Smokeless Tobacco Never       Social History     Substance and Sexual Activity   Alcohol Use Yes    Comment: seldom- rarely       REVIEW OF SYSTEMS:  Constitutional: negative  Eyes: negative  Respiratory: negative  Cardiovascular: negative  Gastrointestinal: negative  Musculoskeletal: negative  Genitourinary: negative except for what is in HPI  Skin: negative   Neurological: negative  Hematological/Lymphatic: negative  Psychological: negative    Physical Exam:    This a 38 y.o. male   Vitals:    22 0746   Resp: 18     Constitutional: Patient in no acute distress; Neuro: alert and oriented to person place and time. Psych: Mood and affect normal.  Skin: Normal  Lungs: Respiratory effort normal  Cardiovascular:  Normal peripheral pulses  Abdomen: Soft, non-tender, non-distended with no CVA, flank pain, hepatosplenomegaly or hernia. Kidneys normal.  Bladder non-tender and not distended. Lymphatics: no palpable lymphadenopathy      Assessment and Plan      1. Combined arterial insufficiency and corporo-venous occlusive erectile dysfunction    2. Priapism             Plan:      No follow-ups on file. Tadalafil 10mg daily, then additional 10mg on demand did not work. Will trial on MUSE. Discussed bimix or uni-mix if MUSE is not covered.           Dr. Chandu Traore MD

## 2023-01-01 ENCOUNTER — HOSPITAL ENCOUNTER (EMERGENCY)
Age: 43
Discharge: HOME | End: 2023-01-01
Payer: MEDICAID

## 2023-01-01 VITALS
DIASTOLIC BLOOD PRESSURE: 82 MMHG | HEART RATE: 90 BPM | RESPIRATION RATE: 18 BRPM | TEMPERATURE: 97.5 F | SYSTOLIC BLOOD PRESSURE: 131 MMHG | OXYGEN SATURATION: 100 %

## 2023-01-01 DIAGNOSIS — J02.0 STREP PHARYNGITIS: Primary | ICD-10-CM

## 2023-01-01 LAB — GROUP A STREP CULTURE, REFLEX: POSITIVE

## 2023-01-01 PROCEDURE — 99213 OFFICE O/P EST LOW 20 MIN: CPT

## 2023-01-01 PROCEDURE — 87880 STREP A ASSAY W/OPTIC: CPT

## 2023-01-01 RX ORDER — AMOXICILLIN 875 MG/1
875 TABLET, COATED ORAL 2 TIMES DAILY
Qty: 20 TABLET | Refills: 0 | Status: SHIPPED | OUTPATIENT
Start: 2023-01-01 | End: 2023-01-11

## 2023-01-01 ASSESSMENT — ENCOUNTER SYMPTOMS
NAUSEA: 0
DIARRHEA: 0
EYE PAIN: 0
VOMITING: 0
BLOOD IN STOOL: 0
RHINORRHEA: 0
WHEEZING: 0
SINUS PRESSURE: 0
ABDOMINAL PAIN: 0
CONSTIPATION: 0
SORE THROAT: 1
SHORTNESS OF BREATH: 0
COUGH: 0

## 2023-01-01 NOTE — Clinical Note
Eren Earl was seen and treated in our emergency department on 1/1/2023. He may return to work on 01/03/2023. If you have any questions or concerns, please don't hesitate to call.       Mitzy Sultana, APRN - CNP

## 2023-01-01 NOTE — ED PROVIDER NOTES
[address] [facility] Encounter      [address] [facility]       Chief Complaint   Patient presents with    Pharyngitis        Nurses Notes reviewed and I agree except as noted in the HPI. HISTORY OF PRESENT ILLNESS   Leslie Tinoco is a 38 y.o. male who presents to urgent care with complaint of sore throat that is been ongoing for 10 days. Patient states that he previously had fever, chills, cough and nasal congestion as well. Patient states that all of the other symptoms have subsided except for the sore throat. Patient states has been taking Tylenol and ibuprofen over-the-counter for his symptoms with minimal relief. Patient denies other symptoms including chest pain, shortness of breath, nausea, vomiting, diarrhea or fever. REVIEW OF SYSTEMS     Review of Systems   Constitutional:  Negative for appetite change, chills, fatigue, fever and unexpected weight change. HENT:  Positive for sore throat. Negative for ear pain, rhinorrhea and sinus pressure. Eyes:  Negative for pain and visual disturbance. Respiratory:  Negative for cough, shortness of breath and wheezing. Cardiovascular:  Negative for chest pain, palpitations and leg swelling. Gastrointestinal:  Negative for abdominal pain, blood in stool, constipation, diarrhea, nausea and vomiting. Genitourinary:  Negative for dysuria, frequency and hematuria. Musculoskeletal:  Negative for arthralgias and joint swelling. Skin:  Negative for rash. Neurological:  Negative for dizziness, syncope, weakness, light-headedness and headaches. Hematological:  Does not bruise/bleed easily. PAST MEDICAL HISTORY   History reviewed. No pertinent past medical history. SURGICAL HISTORY     Patient  has a past surgical history that includes Cervical disc arthroplasty (N/A, 8/12/2022).     CURRENT MEDICATIONS       Discharge Medication List as of 1/1/2023 11:18 AM        CONTINUE these medications which have NOT CHANGED    Details alprostadil (MUSE) 250 MCG pellet 1 each by Transurethral route as needed for Erectile Dysfunction use no more than 3 times per week, Disp-10 each, R-3Normal      sildenafil (REVATIO) 20 MG tablet 2-5 tabletsHistorical Med      tadalafil (CIALIS) 20 MG tablet Take 1 tablet by mouth daily as needed for Erectile Dysfunction, Disp-10 tablet, R-5Normal      acetaminophen (TYLENOL) 325 MG tablet Take 650 mg by mouth every 6 hours as needed for PainHistorical Med      ibuprofen (ADVIL;MOTRIN) 800 MG tablet Take 800 mg by mouth every 6 hours as needed for PainHistorical Med      Multiple Vitamins-Minerals (THERAPEUTIC MULTIVITAMIN-MINERALS) tablet Take 1 tablet by mouth in the morning. Historical Med             ALLERGIES     Patient is has No Known Allergies. FAMILY HISTORY     Patient'sfamily history includes Heart Disease in his mother; High Blood Pressure in his mother; Stroke in his father. SOCIAL HISTORY     Patient  reports that he quit smoking about 22 years ago. His smoking use included cigarettes. He has never used smokeless tobacco. He reports current alcohol use. He reports that he does not use drugs. PHYSICAL EXAM     ED TRIAGE VITALS  BP: 131/82, Temp: 97.5 °F (36.4 °C), Heart Rate: 90, Resp: 18, SpO2: 100 %  Physical Exam  Vitals and nursing note reviewed. Constitutional:       General: He is not in acute distress. Appearance: He is well-developed. He is not diaphoretic. HENT:      Head: Normocephalic and atraumatic. Nose: No congestion or rhinorrhea. Mouth/Throat:      Mouth: Mucous membranes are moist.      Pharynx: Posterior oropharyngeal erythema present. No oropharyngeal exudate. Tonsils: No tonsillar exudate. 1+ on the right. 1+ on the left. Eyes:      Conjunctiva/sclera: Conjunctivae normal.   Cardiovascular:      Rate and Rhythm: Normal rate and regular rhythm. Heart sounds: Normal heart sounds. No murmur heard. No gallop.    Pulmonary:      Effort: Pulmonary effort is normal. No respiratory distress. Breath sounds: Normal breath sounds. No decreased breath sounds, wheezing, rhonchi or rales. Abdominal:      Palpations: Abdomen is soft. Musculoskeletal:         General: Normal range of motion. Cervical back: Normal range of motion and neck supple. Skin:     General: Skin is warm and dry. Neurological:      Mental Status: He is alert and oriented to person, place, and time. DIAGNOSTIC RESULTS   Labs:  Results for orders placed or performed during the hospital encounter of 01/01/23   STREP A ANTIGEN   Result Value Ref Range    GROUP A STREP CULTURE, REFLEX Positive        IMAGING:  No orders to display     URGENT CARE COURSE:        [facility]      Patient presents to urgent care with complaint of sore throat that is been ongoing for 10 days. Differential diagnosis include not limited to  strep throat or viral illness. Patient does test positive for strep throat. This will be treated with oral amoxicillin. Patient to follow-up with primary care provider. Patient instructed to go to ER for worsening symptoms, inability to swallow, inability to keep liquids down, inability to urinate for greater than 8 hours or difficulty breathing. Follow-up with your primary care provider. Increase oral intake. Warm salt water gargles after meals and at bedtime to help with sore throat. May take tylenol or ibuprofen as needed for pain or fever. Medications - No data to display  PROCEDURES:    Procedures    FINALIMPRESSION      1.  Strep pharyngitis        DISPOSITION/PLAN   DISPOSITION Decision To Discharge 01/01/2023 11:18:17 AM    PATIENT REFERRED TO:  JOHN Humphrey - YAZAN Garduno [address] [place] [address]  [phone]    In 2 days    DISCHARGE MEDICATIONS:  Discharge Medication List as of 1/1/2023 11:18 AM        START taking these medications    Details   amoxicillin (AMOXIL) 875 MG tablet Take 1 tablet by mouth 2 times daily for 10 days, Disp-20 tablet, R-0Normal           Discharge Medication List as of 1/1/2023 11:18 AM          JOHN Huddleston CNP, APRN - CNP  01/01/23 1128

## 2025-07-28 ENCOUNTER — OFFICE VISIT (OUTPATIENT)
Dept: UROLOGY | Age: 45
End: 2025-07-28
Payer: COMMERCIAL

## 2025-07-28 VITALS
SYSTOLIC BLOOD PRESSURE: 122 MMHG | DIASTOLIC BLOOD PRESSURE: 72 MMHG | HEIGHT: 68 IN | WEIGHT: 257.9 LBS | BODY MASS INDEX: 39.09 KG/M2

## 2025-07-28 DIAGNOSIS — Z30.09 VASECTOMY EVALUATION: Primary | ICD-10-CM

## 2025-07-28 PROCEDURE — 99213 OFFICE O/P EST LOW 20 MIN: CPT | Performed by: UROLOGY

## 2025-07-28 RX ORDER — ALPRAZOLAM 1 MG/1
1 TABLET ORAL NIGHTLY PRN
Qty: 1 TABLET | Refills: 0 | Status: SHIPPED | OUTPATIENT
Start: 2025-07-28 | End: 2025-07-29

## 2025-07-28 RX ORDER — SULFAMETHOXAZOLE AND TRIMETHOPRIM 800; 160 MG/1; MG/1
1 TABLET ORAL 2 TIMES DAILY
Qty: 6 TABLET | Refills: 0 | Status: SHIPPED | OUTPATIENT
Start: 2025-07-28 | End: 2025-07-31

## 2025-07-28 NOTE — PROGRESS NOTES
Dr. Joseph Sanchez Jr, MD MD  [facility] Consultation / New Patient Visit    Patient:  Tremaine Adkins  YOB: 1980  Date: 7/28/2025  Consult requested from Arabella Hernandez APRN - CNP     HISTORY OF PRESENT ILLNESS:   The patient is a 45 y.o. male who presents today for follow-up for the following problem(s): Vasectomy  Overall the problem(s) : are worsening.  Associated Symptoms: No dysuria, gross hematuria.   Pain Severity:      Today visit:   7/28/25   Vasectomy Discussion:  Patient confirms that the spouse (or significant other) knows about the procedure, and understands that vasectomy is a permanaent form of sterilization, and is in agreement of the procedure.    The patient and I had a long discussion in regards to vasectomy.  The patient understands he is not safe for unprotected intercourse until I have personally viewed at least one semen specimen after vasectomy showing no sperm.  He was told he will need to wait at least 1 month or 15-20 ejaculations (which ever comes LAST) before bringing in a semen specimen after vasectomy. He understands this will be an outpatient procedure.  He understands that there is a possibility of recanalization of the vas deferens creating fertility (less than 1 per 1000).   He also understands that he should use ice, elevation, antiinflammatories and inactivity for two days following the vasectomy; no intercourse or exercise for at least 1 week.  He understands complication of: 1) swelling and bruising in the scrotal area and the testicles, 2) bleeding and hematoma formation, possibly requiring hospitalization and surgical drainage, 3) loss of a testicle, 4) infection, and 5) pain.  He understands long-term complications including: Possible recanalization of the vas producing fertility or chronic pain whether it is minor or significant, even requiring surgical procedures including orchiectomy.  He understands that although this is a very

## 2025-07-30 ENCOUNTER — TELEPHONE (OUTPATIENT)
Dept: UROLOGY | Age: 45
End: 2025-07-30

## (undated) DEVICE — CATHETER IV 16 GAX32.5 IN TRPL BVL LUERLOCK TIP ANGIOCATH

## (undated) DEVICE — SYRINGE MED 10ML LUERLOCK TIP W/O SFTY DISP

## (undated) DEVICE — BAG,BANDED,W/RUBBERBAND,STERILE,30X36: Brand: MEDLINE

## (undated) DEVICE — GOWN,SIRUS,NON REINFRCD,LARGE,SET IN SL: Brand: MEDLINE

## (undated) DEVICE — PENCIL SMK EVAC ALL IN 1 DSGN ENH VISIBILITY IMPROVED AIR

## (undated) DEVICE — AGENT HEMOSTATIC SURGIFLOW MATRIX KIT W/THROMBIN

## (undated) DEVICE — 4.0MM PRECISION ROUND

## (undated) DEVICE — C-ARMOR C-ARM EQUIPMENT COVERS CLEAR STERILE UNIVERSAL FIT 12 PER CASE: Brand: C-ARMOR

## (undated) DEVICE — SUTURE MCRYL SZ 4-0 L27IN ABSRB UD L19MM PS-2 1/2 CIR PRIM Y426H

## (undated) DEVICE — Device

## (undated) DEVICE — EVACUATOR SURG 100CC SIL BLB SUCT RESVR FOR CLS WND DRNGE

## (undated) DEVICE — BASIC SINGLE BASIN BTC-LF: Brand: MEDLINE INDUSTRIES, INC.

## (undated) DEVICE — STRIP,CLOSURE,WOUND,MEDI-STRIP,1/2X4: Brand: MEDLINE

## (undated) DEVICE — DRAIN SURG FLAT W7MMXL20CM FULL PERF

## (undated) DEVICE — BLADE ES L4IN INSUL EDGE

## (undated) DEVICE — INTENDED FOR TISSUE SEPARATION, AND OTHER PROCEDURES THAT REQUIRE A SHARP SURGICAL BLADE TO PUNCTURE OR CUT.: Brand: BARD-PARKER ® CARBON RIB-BACK BLADES

## (undated) DEVICE — COLLAR CERV SERP CNTOUR L235XH5IN L